# Patient Record
Sex: MALE | Race: WHITE | Employment: OTHER | URBAN - METROPOLITAN AREA
[De-identification: names, ages, dates, MRNs, and addresses within clinical notes are randomized per-mention and may not be internally consistent; named-entity substitution may affect disease eponyms.]

---

## 2017-02-15 ENCOUNTER — OFFICE VISIT (OUTPATIENT)
Dept: ORTHOPEDIC SURGERY | Age: 67
End: 2017-02-15

## 2017-02-15 VITALS
BODY MASS INDEX: 29.52 KG/M2 | HEIGHT: 74 IN | HEART RATE: 88 BPM | WEIGHT: 230 LBS | SYSTOLIC BLOOD PRESSURE: 151 MMHG | DIASTOLIC BLOOD PRESSURE: 92 MMHG

## 2017-02-15 DIAGNOSIS — M19.019 ACROMIOCLAVICULAR JOINT ARTHRITIS: ICD-10-CM

## 2017-02-15 DIAGNOSIS — M75.82 ROTATOR CUFF TENDONITIS, LEFT: Primary | ICD-10-CM

## 2017-02-15 DIAGNOSIS — M25.512 LEFT SHOULDER PAIN, UNSPECIFIED CHRONICITY: ICD-10-CM

## 2017-02-15 PROCEDURE — 99203 OFFICE O/P NEW LOW 30 MIN: CPT | Performed by: ORTHOPAEDIC SURGERY

## 2017-02-15 PROCEDURE — 73030 X-RAY EXAM OF SHOULDER: CPT | Performed by: ORTHOPAEDIC SURGERY

## 2017-02-15 PROCEDURE — 20610 DRAIN/INJ JOINT/BURSA W/O US: CPT | Performed by: ORTHOPAEDIC SURGERY

## 2017-03-15 ENCOUNTER — TELEPHONE (OUTPATIENT)
Dept: ENDOCRINOLOGY | Age: 67
End: 2017-03-15

## 2017-03-15 DIAGNOSIS — E10.8 TYPE 1 DIABETES MELLITUS WITH COMPLICATION (HCC): Primary | ICD-10-CM

## 2017-03-23 ENCOUNTER — OFFICE VISIT (OUTPATIENT)
Dept: ENDOCRINOLOGY | Age: 67
End: 2017-03-23

## 2017-03-23 VITALS
SYSTOLIC BLOOD PRESSURE: 122 MMHG | HEART RATE: 72 BPM | HEIGHT: 73 IN | OXYGEN SATURATION: 98 % | WEIGHT: 241.6 LBS | RESPIRATION RATE: 14 BRPM | DIASTOLIC BLOOD PRESSURE: 69 MMHG | BODY MASS INDEX: 32.02 KG/M2

## 2017-03-23 DIAGNOSIS — E10.11 DIABETIC KETOACIDOSIS WITH COMA ASSOCIATED WITH TYPE 1 DIABETES MELLITUS (HCC): Primary | ICD-10-CM

## 2017-03-23 DIAGNOSIS — E78.00 PURE HYPERCHOLESTEROLEMIA: ICD-10-CM

## 2017-03-23 LAB — HBA1C MFR BLD: 7.2 %

## 2017-03-23 PROCEDURE — 99214 OFFICE O/P EST MOD 30 MIN: CPT | Performed by: INTERNAL MEDICINE

## 2017-03-23 PROCEDURE — 83036 HEMOGLOBIN GLYCOSYLATED A1C: CPT | Performed by: INTERNAL MEDICINE

## 2017-03-29 ENCOUNTER — OFFICE VISIT (OUTPATIENT)
Dept: ORTHOPEDIC SURGERY | Age: 67
End: 2017-03-29

## 2017-03-29 ENCOUNTER — TELEPHONE (OUTPATIENT)
Dept: ORTHOPEDIC SURGERY | Age: 67
End: 2017-03-29

## 2017-03-29 VITALS
WEIGHT: 241.62 LBS | HEIGHT: 73 IN | BODY MASS INDEX: 32.02 KG/M2 | SYSTOLIC BLOOD PRESSURE: 107 MMHG | DIASTOLIC BLOOD PRESSURE: 59 MMHG | HEART RATE: 75 BPM | RESPIRATION RATE: 17 BRPM

## 2017-03-29 DIAGNOSIS — M25.512 ACUTE PAIN OF LEFT SHOULDER: Primary | ICD-10-CM

## 2017-03-29 PROCEDURE — 99213 OFFICE O/P EST LOW 20 MIN: CPT | Performed by: ORTHOPAEDIC SURGERY

## 2017-03-30 ENCOUNTER — TELEPHONE (OUTPATIENT)
Dept: ORTHOPEDIC SURGERY | Age: 67
End: 2017-03-30

## 2017-06-21 DIAGNOSIS — E78.00 PURE HYPERCHOLESTEROLEMIA: ICD-10-CM

## 2017-06-21 DIAGNOSIS — E10.11 DIABETIC KETOACIDOSIS WITH COMA ASSOCIATED WITH TYPE 1 DIABETES MELLITUS (HCC): ICD-10-CM

## 2017-06-21 LAB
A/G RATIO: 1.9 (ref 1.1–2.2)
ALBUMIN SERPL-MCNC: 4.3 G/DL (ref 3.4–5)
ALP BLD-CCNC: 96 U/L (ref 40–129)
ALT SERPL-CCNC: 16 U/L (ref 10–40)
ANION GAP SERPL CALCULATED.3IONS-SCNC: 12 MMOL/L (ref 3–16)
AST SERPL-CCNC: 16 U/L (ref 15–37)
BILIRUB SERPL-MCNC: 0.8 MG/DL (ref 0–1)
BUN BLDV-MCNC: 16 MG/DL (ref 7–20)
CALCIUM SERPL-MCNC: 8.8 MG/DL (ref 8.3–10.6)
CHLORIDE BLD-SCNC: 103 MMOL/L (ref 99–110)
CHOLESTEROL, TOTAL: 134 MG/DL (ref 0–199)
CO2: 26 MMOL/L (ref 21–32)
CREAT SERPL-MCNC: 1 MG/DL (ref 0.8–1.3)
CREATININE URINE: 133.5 MG/DL (ref 39–259)
ESTIMATED AVERAGE GLUCOSE: 154.2 MG/DL
GFR AFRICAN AMERICAN: >60
GFR NON-AFRICAN AMERICAN: >60
GLOBULIN: 2.3 G/DL
GLUCOSE BLD-MCNC: 154 MG/DL (ref 70–99)
HBA1C MFR BLD: 7 %
HDLC SERPL-MCNC: 33 MG/DL (ref 40–60)
LDL CHOLESTEROL CALCULATED: 87 MG/DL
MICROALBUMIN UR-MCNC: <1.2 MG/DL
MICROALBUMIN/CREAT UR-RTO: NORMAL MG/G (ref 0–30)
POTASSIUM SERPL-SCNC: 4.3 MMOL/L (ref 3.5–5.1)
SODIUM BLD-SCNC: 141 MMOL/L (ref 136–145)
TOTAL PROTEIN: 6.6 G/DL (ref 6.4–8.2)
TRIGL SERPL-MCNC: 70 MG/DL (ref 0–150)
TSH SERPL DL<=0.05 MIU/L-ACNC: 5.09 UIU/ML (ref 0.27–4.2)
VLDLC SERPL CALC-MCNC: 14 MG/DL

## 2017-09-14 ENCOUNTER — OFFICE VISIT (OUTPATIENT)
Dept: FAMILY MEDICINE CLINIC | Age: 67
End: 2017-09-14

## 2017-09-14 VITALS
WEIGHT: 243 LBS | DIASTOLIC BLOOD PRESSURE: 78 MMHG | HEART RATE: 96 BPM | HEIGHT: 74 IN | SYSTOLIC BLOOD PRESSURE: 130 MMHG | OXYGEN SATURATION: 95 % | BODY MASS INDEX: 31.18 KG/M2 | TEMPERATURE: 97.3 F

## 2017-09-14 DIAGNOSIS — E78.5 HYPERLIPIDEMIA, UNSPECIFIED HYPERLIPIDEMIA TYPE: Primary | ICD-10-CM

## 2017-09-14 DIAGNOSIS — Z11.59 NEED FOR HEPATITIS C SCREENING TEST: ICD-10-CM

## 2017-09-14 DIAGNOSIS — E55.9 VITAMIN D DEFICIENCY: ICD-10-CM

## 2017-09-14 PROCEDURE — 99203 OFFICE O/P NEW LOW 30 MIN: CPT | Performed by: NURSE PRACTITIONER

## 2017-09-14 RX ORDER — SIMVASTATIN 20 MG
TABLET ORAL
COMMUNITY
Start: 2017-06-22 | End: 2018-03-13

## 2017-09-14 ASSESSMENT — ENCOUNTER SYMPTOMS
ALLERGIC/IMMUNOLOGIC NEGATIVE: 1
RESPIRATORY NEGATIVE: 1
SHORTNESS OF BREATH: 0
EYES NEGATIVE: 1
GASTROINTESTINAL NEGATIVE: 1

## 2017-10-10 DIAGNOSIS — E78.5 HYPERLIPIDEMIA, UNSPECIFIED HYPERLIPIDEMIA TYPE: ICD-10-CM

## 2017-10-10 DIAGNOSIS — E55.9 VITAMIN D DEFICIENCY: ICD-10-CM

## 2017-10-10 DIAGNOSIS — Z11.59 NEED FOR HEPATITIS C SCREENING TEST: ICD-10-CM

## 2017-10-10 PROBLEM — E11.3293 MILD NONPROLIFERATIVE DIABETIC RETINOPATHY OF BOTH EYES WITHOUT MACULAR EDEMA ASSOCIATED WITH TYPE 2 DIABETES MELLITUS (HCC): Status: ACTIVE | Noted: 2017-10-10

## 2017-10-10 LAB
A/G RATIO: 1.8 (ref 1.1–2.2)
ALBUMIN SERPL-MCNC: 4.2 G/DL (ref 3.4–5)
ALP BLD-CCNC: 103 U/L (ref 40–129)
ALT SERPL-CCNC: 22 U/L (ref 10–40)
ANION GAP SERPL CALCULATED.3IONS-SCNC: 13 MMOL/L (ref 3–16)
AST SERPL-CCNC: 18 U/L (ref 15–37)
BILIRUB SERPL-MCNC: 0.8 MG/DL (ref 0–1)
BUN BLDV-MCNC: 19 MG/DL (ref 7–20)
CALCIUM SERPL-MCNC: 9 MG/DL (ref 8.3–10.6)
CHLORIDE BLD-SCNC: 100 MMOL/L (ref 99–110)
CHOLESTEROL, TOTAL: 133 MG/DL (ref 0–199)
CO2: 26 MMOL/L (ref 21–32)
CREAT SERPL-MCNC: 1 MG/DL (ref 0.8–1.3)
GFR AFRICAN AMERICAN: >60
GFR NON-AFRICAN AMERICAN: >60
GLOBULIN: 2.4 G/DL
GLUCOSE BLD-MCNC: 165 MG/DL (ref 70–99)
HDLC SERPL-MCNC: 34 MG/DL (ref 40–60)
HEPATITIS C ANTIBODY INTERPRETATION: NORMAL
LDL CHOLESTEROL CALCULATED: 82 MG/DL
POTASSIUM SERPL-SCNC: 4.5 MMOL/L (ref 3.5–5.1)
SODIUM BLD-SCNC: 139 MMOL/L (ref 136–145)
TOTAL PROTEIN: 6.6 G/DL (ref 6.4–8.2)
TRIGL SERPL-MCNC: 85 MG/DL (ref 0–150)
VITAMIN D 25-HYDROXY: 26.9 NG/ML
VLDLC SERPL CALC-MCNC: 17 MG/DL

## 2017-10-30 RX ORDER — LANCETS 33 GAUGE
EACH MISCELLANEOUS
Qty: 300 EACH | Refills: 1 | Status: SHIPPED | OUTPATIENT
Start: 2017-10-30 | End: 2018-02-20 | Stop reason: SDUPTHER

## 2017-10-30 RX ORDER — PEN NEEDLE, DIABETIC 32GX 5/32"
NEEDLE, DISPOSABLE MISCELLANEOUS
Qty: 360 EACH | Refills: 1 | Status: SHIPPED | OUTPATIENT
Start: 2017-10-30 | End: 2018-02-20 | Stop reason: SDUPTHER

## 2017-11-17 NOTE — TELEPHONE ENCOUNTER
Pt called in for a refill of his Gabapentin 600 mg that has no refills left. He takes one every six hours.  [place] would [place] [facility] [place] to [facility] [place] [id number]

## 2017-11-20 RX ORDER — GABAPENTIN 600 MG/1
600 TABLET ORAL DAILY
Qty: 90 TABLET | Refills: 1 | Status: SHIPPED | OUTPATIENT
Start: 2017-11-20 | End: 2017-12-21 | Stop reason: SDUPTHER

## 2017-12-12 ENCOUNTER — OFFICE VISIT (OUTPATIENT)
Dept: ENDOCRINOLOGY | Age: 67
End: 2017-12-12

## 2017-12-12 VITALS
SYSTOLIC BLOOD PRESSURE: 124 MMHG | HEART RATE: 82 BPM | OXYGEN SATURATION: 97 % | DIASTOLIC BLOOD PRESSURE: 83 MMHG | BODY MASS INDEX: 31.34 KG/M2 | WEIGHT: 244.2 LBS | HEIGHT: 74 IN | RESPIRATION RATE: 16 BRPM

## 2017-12-12 DIAGNOSIS — E78.00 PURE HYPERCHOLESTEROLEMIA: ICD-10-CM

## 2017-12-12 DIAGNOSIS — E10.9 TYPE 1 DIABETES MELLITUS WITHOUT COMPLICATION (HCC): Primary | ICD-10-CM

## 2017-12-12 LAB — HBA1C MFR BLD: 7.3 %

## 2017-12-12 PROCEDURE — G8484 FLU IMMUNIZE NO ADMIN: HCPCS | Performed by: INTERNAL MEDICINE

## 2017-12-12 PROCEDURE — 99214 OFFICE O/P EST MOD 30 MIN: CPT | Performed by: INTERNAL MEDICINE

## 2017-12-12 PROCEDURE — 83036 HEMOGLOBIN GLYCOSYLATED A1C: CPT | Performed by: INTERNAL MEDICINE

## 2017-12-12 PROCEDURE — 1036F TOBACCO NON-USER: CPT | Performed by: INTERNAL MEDICINE

## 2017-12-12 PROCEDURE — 3045F PR MOST RECENT HEMOGLOBIN A1C LEVEL 7.0-9.0%: CPT | Performed by: INTERNAL MEDICINE

## 2017-12-12 PROCEDURE — 3017F COLORECTAL CA SCREEN DOC REV: CPT | Performed by: INTERNAL MEDICINE

## 2017-12-12 PROCEDURE — G8417 CALC BMI ABV UP PARAM F/U: HCPCS | Performed by: INTERNAL MEDICINE

## 2017-12-12 PROCEDURE — 1123F ACP DISCUSS/DSCN MKR DOCD: CPT | Performed by: INTERNAL MEDICINE

## 2017-12-12 PROCEDURE — 4040F PNEUMOC VAC/ADMIN/RCVD: CPT | Performed by: INTERNAL MEDICINE

## 2017-12-12 PROCEDURE — G8427 DOCREV CUR MEDS BY ELIG CLIN: HCPCS | Performed by: INTERNAL MEDICINE

## 2017-12-12 NOTE — PROGRESS NOTES
Endocrinology     Follow up visit   Gillian Ball M.D. Phone: [phone]   FAX: [phone]       Terressa Sandhoff   YOB: 1950    Date of Visit:  12/12/2017    Allergies   Allergen Reactions    Macadamia Nut Oil      Other reaction(s): Not Recorded    Morphine Other (See Comments)     Skin turns gray and feel like he's covered in ants    Morphine And Related Other (See Comments)     Feels like bugs are crawling all over it and skin gets grayish color     Outpatient Prescriptions Marked as Taking for the 12/12/17 encounter (Office Visit) with Kathlee Saint, MD   Medication Sig Dispense Refill    gabapentin (NEURONTIN) 600 MG tablet Take 1 tablet by mouth daily 90 tablet 1    BD PEN NEEDLE CHELSEA U/F 32G X 4 MM MISC USE WITH LANTUS AND HUMALOG PENS 4 TIMES A DAY. 360 each 1    ONETOUCH DELICA LANCETS 07Z MISC USE 3 TIMES DAILY BEFORE  MEALS. 300 each 1    simvastatin (ZOCOR) 20 MG tablet       insulin lispro (HUMALOG KWIKPEN) 100 UNIT/ML pen Inject 12-14 Units into the skin 3 times daily (before meals) 45 Pen 0    insulin glargine (BASAGLAR KWIKPEN) 100 UNIT/ML injection pen Inject 40 Units into the skin nightly 15 Pen 5    aspirin 81 MG tablet Take 81 mg by mouth daily      glucose blood VI test strips (ASCENSIA AUTODISC VI;ONE TOUCH ULTRA TEST VI) strip 1 each by In Vitro route 3 times daily Dx Code E11.8           Vitals:    12/12/17 0855   BP: 124/83   Site: Left Arm   Position: Sitting   Cuff Size: Large Adult   Pulse: 82   Resp: 16   SpO2: 97%   Weight: 244 lb 3.2 oz (110.8 kg)   Height: 6' 2\" (1.88 m)     Body mass index is 31.35 kg/m².      Wt Readings from Last 3 Encounters:   12/12/17 244 lb 3.2 oz (110.8 kg)   09/14/17 243 lb (110.2 kg)   05/25/17 238 lb (108 kg)     BP Readings from Last 3 Encounters:   12/12/17 124/83   09/14/17 130/78   05/26/17 137/79        Past Medical History:   Diagnosis Date    Chronic kidney disease     [facility]2017 Dr Rocklin Habermann Mild nonproliferative diabetic retinopathy of both eyes without macular edema associated with type 2 diabetes mellitus ([facility] 10/10/2017    Type 2 diabetes mellitus without complication (HCC)     Type II or unspecified type diabetes mellitus without mention of complication, not stated as uncontrolled      Past Surgical History:   Procedure Laterality Date    APPENDECTOMY      COLONOSCOPY  05/20/2010    Dr. Tracy Travis       History reviewed. No pertinent family history. History   Smoking Status    Never Smoker   Smokeless Tobacco    Never Used      History   Alcohol Use    Yes     Comment: social        HPI      Kari Nur is a 78 y.o. male who is here for follow up of uncontrolled DM, HLP. PCP Ekaterina Tidwell MD       Patient has a PMH of Type 2 DM, hypertension, hyperlipidemia, DKA, BPH, PE, DVT    Diagnosed with Diabetes Mellitus type 2 in 2004,  Course has been variable . Microvascular complications: No known retinopathy (Last eye exam: 10/17), No nephropathy . No Peripheral neuropathy. He was having polyuria, polydypsia, dry mouth. Went to ER at [facility] and was found to have UTI and DKA. BS were >800. Oral meds were stopped and he was started in insulin. Home regimen:  Currently on basaglar  36 units QHS  Humalog 10 units TID with meals + Medium dose SSI     Previous medications: metformin, glimipiride. Blood glucose trend   fasting    pre-lunch 100-140  pre-supper 100-150  bedtime 120-160    No significant Hypoglycemia. Diet: Eats 3 meals/day. Nutrition education:No   Exercise: Walks      Hyperlipidemia: Currently is on Simvastatin 40 mg daily       Review of Systems   Constitutional: Negative for malaise/fatigue and weight loss. HENT: Negative for sore throat. Eyes: Negative for blurred vision. Respiratory: Negative for cough and shortness of breath.     Cardiovascular: Negative for chest

## 2017-12-21 ENCOUNTER — TELEPHONE (OUTPATIENT)
Dept: ENDOCRINOLOGY | Age: 67
End: 2017-12-21

## 2017-12-21 RX ORDER — GABAPENTIN 600 MG/1
TABLET ORAL
Qty: 120 TABLET | Refills: 2 | Status: SHIPPED | OUTPATIENT
Start: 2017-12-21 | End: 2018-04-09 | Stop reason: SDUPTHER

## 2017-12-21 NOTE — TELEPHONE ENCOUNTER
I let both Delma and mr. Jenise Simons know the Rx was written for once daily. Mr. Jenise Simons said the other doctor he was seeing had him taking it 4x daily.

## 2018-01-18 ENCOUNTER — OFFICE VISIT (OUTPATIENT)
Dept: INTERNAL MEDICINE | Age: 68
End: 2018-01-18

## 2018-01-18 ENCOUNTER — TELEPHONE (OUTPATIENT)
Dept: FAMILY MEDICINE CLINIC | Age: 68
End: 2018-01-18

## 2018-01-18 VITALS
TEMPERATURE: 98.8 F | WEIGHT: 245 LBS | HEART RATE: 84 BPM | SYSTOLIC BLOOD PRESSURE: 138 MMHG | BODY MASS INDEX: 31.46 KG/M2 | OXYGEN SATURATION: 97 % | DIASTOLIC BLOOD PRESSURE: 80 MMHG

## 2018-01-18 DIAGNOSIS — R68.89 FLU-LIKE SYMPTOMS: Primary | ICD-10-CM

## 2018-01-18 DIAGNOSIS — J01.10 ACUTE NON-RECURRENT FRONTAL SINUSITIS: ICD-10-CM

## 2018-01-18 LAB
INFLUENZA A ANTIBODY: NORMAL
INFLUENZA B ANTIBODY: NORMAL

## 2018-01-18 PROCEDURE — 87804 INFLUENZA ASSAY W/OPTIC: CPT | Performed by: NURSE PRACTITIONER

## 2018-01-18 PROCEDURE — 4040F PNEUMOC VAC/ADMIN/RCVD: CPT | Performed by: NURSE PRACTITIONER

## 2018-01-18 PROCEDURE — 1123F ACP DISCUSS/DSCN MKR DOCD: CPT | Performed by: NURSE PRACTITIONER

## 2018-01-18 PROCEDURE — G8427 DOCREV CUR MEDS BY ELIG CLIN: HCPCS | Performed by: NURSE PRACTITIONER

## 2018-01-18 PROCEDURE — 1036F TOBACCO NON-USER: CPT | Performed by: NURSE PRACTITIONER

## 2018-01-18 PROCEDURE — 3017F COLORECTAL CA SCREEN DOC REV: CPT | Performed by: NURSE PRACTITIONER

## 2018-01-18 PROCEDURE — 99213 OFFICE O/P EST LOW 20 MIN: CPT | Performed by: NURSE PRACTITIONER

## 2018-01-18 PROCEDURE — G8417 CALC BMI ABV UP PARAM F/U: HCPCS | Performed by: NURSE PRACTITIONER

## 2018-01-18 PROCEDURE — G8484 FLU IMMUNIZE NO ADMIN: HCPCS | Performed by: NURSE PRACTITIONER

## 2018-01-18 RX ORDER — AMOXICILLIN 500 MG/1
500 CAPSULE ORAL 3 TIMES DAILY
Qty: 21 CAPSULE | Refills: 0 | Status: SHIPPED | OUTPATIENT
Start: 2018-01-18 | End: 2018-01-25

## 2018-01-18 ASSESSMENT — ENCOUNTER SYMPTOMS
DIARRHEA: 0
NAUSEA: 1
WHEEZING: 0
SINUS PRESSURE: 1
COUGH: 1
CONSTIPATION: 0
RHINORRHEA: 1
SHORTNESS OF BREATH: 0
SINUS PAIN: 1
VOMITING: 0

## 2018-01-18 NOTE — PROGRESS NOTES
Subjective:      Patient ID: Wesley Ly is a 78 y.o. male. HPI   Patient comes to the office complaining of cough, congestion, drainage that has been present for the last several days. His cough is not productive of yellow sputum. Patient does have associated fever or chills as high as 100.6, last fever yesterday evening. Patient reports gastrointestinal symptoms related to His present condition of nausea, thinks it is related to otc cough liquid medication. His symptoms started 3 days. Denies any sob, cp, difficultry breathing. Does report body aches all over. Did not receive the flu shot this year. Thinks that he may have the flu. 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 Miriam Ely       History reviewed. No pertinent family history. Social History     Social History    Marital status:      Spouse name: N/A    Number of children: N/A    Years of education: N/A     Occupational History    Not on file. Social History Main Topics    Smoking status: Never Smoker    Smokeless tobacco: Never Used    Alcohol use Yes      Comment: social     Drug use: No    Sexual activity: Not on file     Other Topics Concern    Not on file     Social History Narrative    No narrative on file       Review of Systems   Constitutional: Positive for chills, fatigue and fever. HENT: Positive for postnasal drip, rhinorrhea, sinus pain and sinus pressure. Respiratory: Positive for cough. Negative for shortness of breath and wheezing. Cardiovascular: Negative for chest pain and palpitations. Gastrointestinal: Positive for nausea. Negative for constipation, diarrhea and vomiting. Hematological: Negative for adenopathy. Objective:     Vitals:    01/18/18 1513   BP: 138/80   Site: Left Arm   Position: Sitting   Cuff Size: Medium Adult   Pulse: 84   Temp: 98.8 °F (37.1 °C)   SpO2: 97%   Weight: 245 lb (111.1 kg)     Physical Exam   Constitutional: He appears well-developed and well-nourished. HENT:   Nose: Right sinus exhibits frontal sinus tenderness. Left sinus exhibits frontal sinus tenderness. Cardiovascular: Normal rate, regular rhythm and normal heart sounds. Pulmonary/Chest: Effort normal and breath sounds normal.   Skin: Skin is warm and dry. Psychiatric: He has a normal mood and affect. His behavior is normal. Judgment and thought content normal.     Current Outpatient Prescriptions   Medication Sig Dispense Refill    amoxicillin (AMOXIL) 500 MG capsule Take 1 capsule by mouth 3 times daily for 7 days 21 capsule 0    gabapentin (NEURONTIN) 600 MG tablet Take 1 tablet 4 times a day 120 tablet 2    BD PEN NEEDLE CHELSEA U/F 32G X 4 MM MISC USE WITH LANTUS AND HUMALOG PENS 4 TIMES A DAY. 360 each 1    ONETOUCH DELICA LANCETS 64E MISC USE 3 TIMES DAILY BEFORE  MEALS.  300 each 1    insulin aspart (NOVOLOG) 100 UNIT/ML injection vial Inject 15 Units into the skin      simvastatin (ZOCOR) 20 MG tablet       insulin lispro (HUMALOG KWIKPEN) 100 UNIT/ML pen Inject 12-14 Units into the skin 3 times daily (before meals) 45 Pen 0    insulin glargine (BASAGLAR KWIKPEN) 100 UNIT/ML injection pen Inject 40 Units into the skin nightly 15 Pen 5    aspirin 81 MG tablet Take 81 mg by mouth daily      glucose blood VI test strips (ASCENSIA AUTODISC VI;ONE TOUCH ULTRA TEST VI) strip 1 each by In Vitro route 3 times daily Dx Code E11.8      meloxicam

## 2018-02-15 ENCOUNTER — OFFICE VISIT (OUTPATIENT)
Dept: DERMATOLOGY | Age: 68
End: 2018-02-15

## 2018-02-15 DIAGNOSIS — L57.0 KERATOSIS, ACTINIC: ICD-10-CM

## 2018-02-15 DIAGNOSIS — L82.0 SEBORRHEIC KERATOSES, INFLAMED: Primary | ICD-10-CM

## 2018-02-15 DIAGNOSIS — L82.1 SEBORRHEIC KERATOSES: ICD-10-CM

## 2018-02-15 PROCEDURE — 1036F TOBACCO NON-USER: CPT | Performed by: DERMATOLOGY

## 2018-02-15 PROCEDURE — 99213 OFFICE O/P EST LOW 20 MIN: CPT | Performed by: DERMATOLOGY

## 2018-02-15 PROCEDURE — G8484 FLU IMMUNIZE NO ADMIN: HCPCS | Performed by: DERMATOLOGY

## 2018-02-15 PROCEDURE — 1123F ACP DISCUSS/DSCN MKR DOCD: CPT | Performed by: DERMATOLOGY

## 2018-02-15 PROCEDURE — 3017F COLORECTAL CA SCREEN DOC REV: CPT | Performed by: DERMATOLOGY

## 2018-02-15 PROCEDURE — 17003 DESTRUCT PREMALG LES 2-14: CPT | Performed by: DERMATOLOGY

## 2018-02-15 PROCEDURE — G8427 DOCREV CUR MEDS BY ELIG CLIN: HCPCS | Performed by: DERMATOLOGY

## 2018-02-15 PROCEDURE — G8417 CALC BMI ABV UP PARAM F/U: HCPCS | Performed by: DERMATOLOGY

## 2018-02-15 PROCEDURE — 17110 DESTRUCTION B9 LES UP TO 14: CPT | Performed by: DERMATOLOGY

## 2018-02-15 PROCEDURE — 17000 DESTRUCT PREMALG LESION: CPT | Performed by: DERMATOLOGY

## 2018-02-15 PROCEDURE — 4040F PNEUMOC VAC/ADMIN/RCVD: CPT | Performed by: DERMATOLOGY

## 2018-02-15 NOTE — PROGRESS NOTES
[facility]) Dermatology  Brandin Jacobson M.D.  [phone]       Gini Dickinson  1950    78 y.o. male     Date of Visit: 2/15/2018    Chief Complaint:   Chief Complaint   Patient presents with    New Patient     No hx of skin cancer    Skin Lesion     head and chest        I was asked to see this patient by Dr. Mcbride ref. provider found. History of Present Illness:  1. New scaly papule right parasternal chest-new in the last 2 weeks. Pruritic. Rubs on his clothing. Multiple other similar smaller lesions central chest.     Multiple scaly papules on his scalp. Dry. Increasing in number. Scaly raised papule left scalp. Irritated. No previous personal or family history of skin cancer. Always wears a hat. Review of Systems:  Constitutional: Reports general sense of well-being       Past Medical History, Surgical History, Family History, Medications and Allergies reviewed. Social History:   Social History     Social History    Marital status:      Spouse name: N/A    Number of children: N/A    Years of education: N/A     Occupational History    Not on file. Social History Main Topics    Smoking status: Never Smoker    Smokeless tobacco: Never Used    Alcohol use Yes      Comment: social     Drug use: No    Sexual activity: Not on file     Other Topics Concern    Not on file     Social History Narrative    No narrative on file       Physical Examination       -General: Well-appearing, NAD  Right parasternal chest hyperkeratotic stuck on tan papule-inflamed seborrheic keratosis. Left scalp hyperkeratotic stuck on verrucous papule-seborrheic keratosis. Multiple gritty erythematous papules over his scalp. Multiple smaller tan stuck on noninflamed papules central chest      Assessment and Plan     1. Seborrheic keratoses, inflamed - Right chest, left scalp lesion(s) treated w/ liquid nitrogen. Edu re: risk of blister formation, discomfort, scar, hypopigmentation.

## 2018-02-20 ENCOUNTER — TELEPHONE (OUTPATIENT)
Dept: ENDOCRINOLOGY | Age: 68
End: 2018-02-20

## 2018-02-20 RX ORDER — LANCETS 33 GAUGE
EACH MISCELLANEOUS
Qty: 300 EACH | Refills: 2 | Status: SHIPPED | OUTPATIENT
Start: 2018-02-20 | End: 2018-02-26 | Stop reason: SDUPTHER

## 2018-02-20 NOTE — TELEPHONE ENCOUNTER
Pt stopped in to say he needs refills on Basaglar, 4mm Pen Needles, One touch Delica Lancets and One touch Test strips All called to [facility] in pharmacy. He also updated his insurance with new card; [employer].

## 2018-02-23 NOTE — TELEPHONE ENCOUNTER
PT called stating he needs his FMLA PPW filled out ASAP and needs to be handed in early next week. Thanks!

## 2018-02-26 ENCOUNTER — TELEPHONE (OUTPATIENT)
Dept: ENDOCRINOLOGY | Age: 68
End: 2018-02-26

## 2018-02-26 RX ORDER — LANCETS 33 GAUGE
EACH MISCELLANEOUS
Qty: 300 EACH | Refills: 2 | Status: SHIPPED | OUTPATIENT
Start: 2018-02-26 | End: 2018-02-27 | Stop reason: SDUPTHER

## 2018-02-27 RX ORDER — LANCETS 33 GAUGE
EACH MISCELLANEOUS
Qty: 300 EACH | Refills: 2 | Status: SHIPPED | OUTPATIENT
Start: 2018-02-27

## 2018-02-27 RX ORDER — INSULIN GLARGINE 100 [IU]/ML
36 INJECTION, SOLUTION SUBCUTANEOUS NIGHTLY
Qty: 45 ML | Refills: 1 | Status: SHIPPED | OUTPATIENT
Start: 2018-02-27 | End: 2018-06-25 | Stop reason: SDUPTHER

## 2018-02-27 RX ORDER — INSULIN GLARGINE 100 [IU]/ML
36 INJECTION, SOLUTION SUBCUTANEOUS NIGHTLY
COMMUNITY
End: 2018-02-27 | Stop reason: SDUPTHER

## 2018-03-09 DIAGNOSIS — E10.9 TYPE 1 DIABETES MELLITUS WITHOUT COMPLICATION (HCC): ICD-10-CM

## 2018-03-09 DIAGNOSIS — E78.00 PURE HYPERCHOLESTEROLEMIA: ICD-10-CM

## 2018-03-09 LAB
A/G RATIO: 1.7 (ref 1.1–2.2)
ALBUMIN SERPL-MCNC: 4.4 G/DL (ref 3.4–5)
ALP BLD-CCNC: 97 U/L (ref 40–129)
ALT SERPL-CCNC: 24 U/L (ref 10–40)
ANION GAP SERPL CALCULATED.3IONS-SCNC: 15 MMOL/L (ref 3–16)
AST SERPL-CCNC: 21 U/L (ref 15–37)
BILIRUB SERPL-MCNC: 0.7 MG/DL (ref 0–1)
BUN BLDV-MCNC: 26 MG/DL (ref 7–20)
CALCIUM SERPL-MCNC: 8.9 MG/DL (ref 8.3–10.6)
CHLORIDE BLD-SCNC: 102 MMOL/L (ref 99–110)
CHOLESTEROL, TOTAL: 129 MG/DL (ref 0–199)
CO2: 26 MMOL/L (ref 21–32)
CREAT SERPL-MCNC: 1.1 MG/DL (ref 0.8–1.3)
CREATININE URINE: 136 MG/DL (ref 39–259)
ESTIMATED AVERAGE GLUCOSE: 159.9 MG/DL
GFR AFRICAN AMERICAN: >60
GFR NON-AFRICAN AMERICAN: >60
GLOBULIN: 2.6 G/DL
GLUCOSE BLD-MCNC: 158 MG/DL (ref 70–99)
HBA1C MFR BLD: 7.2 %
HDLC SERPL-MCNC: 33 MG/DL (ref 40–60)
LDL CHOLESTEROL CALCULATED: 78 MG/DL
MICROALBUMIN UR-MCNC: <1.2 MG/DL
MICROALBUMIN/CREAT UR-RTO: NORMAL MG/G (ref 0–30)
POTASSIUM SERPL-SCNC: 4.5 MMOL/L (ref 3.5–5.1)
SODIUM BLD-SCNC: 143 MMOL/L (ref 136–145)
TOTAL PROTEIN: 7 G/DL (ref 6.4–8.2)
TRIGL SERPL-MCNC: 92 MG/DL (ref 0–150)
TSH SERPL DL<=0.05 MIU/L-ACNC: 5.22 UIU/ML (ref 0.27–4.2)
VLDLC SERPL CALC-MCNC: 18 MG/DL

## 2018-03-13 ENCOUNTER — OFFICE VISIT (OUTPATIENT)
Dept: FAMILY MEDICINE CLINIC | Age: 68
End: 2018-03-13

## 2018-03-13 VITALS
HEART RATE: 95 BPM | WEIGHT: 249 LBS | TEMPERATURE: 98.4 F | HEIGHT: 74 IN | SYSTOLIC BLOOD PRESSURE: 140 MMHG | DIASTOLIC BLOOD PRESSURE: 86 MMHG | BODY MASS INDEX: 31.95 KG/M2 | OXYGEN SATURATION: 96 %

## 2018-03-13 DIAGNOSIS — Z23 NEED FOR PNEUMOCOCCAL VACCINATION: ICD-10-CM

## 2018-03-13 DIAGNOSIS — E78.5 HYPERLIPIDEMIA, UNSPECIFIED HYPERLIPIDEMIA TYPE: Primary | ICD-10-CM

## 2018-03-13 RX ORDER — SIMVASTATIN 40 MG
40 TABLET ORAL EVERY EVENING
Qty: 90 TABLET | Refills: 1 | Status: SHIPPED | OUTPATIENT
Start: 2018-03-13 | End: 2018-10-09 | Stop reason: SDUPTHER

## 2018-03-13 RX ORDER — SIMVASTATIN 20 MG
TABLET ORAL
Qty: 30 TABLET | Status: CANCELLED | OUTPATIENT
Start: 2018-03-13

## 2018-03-13 ASSESSMENT — ENCOUNTER SYMPTOMS
GASTROINTESTINAL NEGATIVE: 1
ALLERGIC/IMMUNOLOGIC NEGATIVE: 1
EYES NEGATIVE: 1
RESPIRATORY NEGATIVE: 1
SHORTNESS OF BREATH: 0

## 2018-03-13 NOTE — PROGRESS NOTES
3/13/18    Liza Coleman  1950      Chief Complaint   Patient presents with    Hyperlipidemia       Vitals:    03/13/18 0900   BP: (!) 140/86   Pulse: 95   Temp: 98.4 °F (36.9 °C)   SpO2: 96%         Immunization History   Administered Date(s) Administered    Pneumococcal 13-valent Conjugate ([id number]) 02/01/2017    Pneumococcal Polysaccharide ([id number]) 03/13/2018       Allergies   Allergen Reactions    Morphine Other (See Comments)     Skin turns gray and feel like he's covered in ants    Morphine And Related Other (See Comments)     Feels like bugs are crawling all over it and skin gets grayish color     Outpatient Prescriptions Marked as Taking for the 3/13/18 encounter (Office Visit) with Omer Brice NP   Medication Sig Dispense Refill    simvastatin (ZOCOR) 40 MG tablet Take 1 tablet by mouth every evening 90 tablet 1    zoster recombinant adjuvanted vaccine (SHINGRIX) 50 MCG SUSR injection Inject 0.5 mLs into the muscle once for 1 dose 1 each 0    ONETOUCH DELICA LANCETS 51S MISC Dx Code E 10.9 300 each 2    insulin glargine (BASAGLAR KWIKPEN) 100 UNIT/ML injection pen Dispense Basaglar Dx Code E 10.9 45 mL 1    glucose blood VI test strips (ASCENSIA AUTODISC VI;ONE TOUCH ULTRA TEST VI) strip 1 each by In Vitro route 3 times daily Dx Code E 10.9 300 each 2    Insulin Pen Needle (BD PEN NEEDLE CHELSEA U/F) 32G X 4 MM MISC Inject 1 each into the skin 4 times daily (after meals and at bedtime) E 10.9 360 each 1    BASAGLAR KWIKPEN 100 UNIT/ML injection pen Inject 36 Units into the skin nightly 45 mL 1    gabapentin (NEURONTIN) 600 MG tablet Take 1 tablet 4 times a day 120 tablet 2    insulin lispro (HUMALOG KWIKPEN) 100 UNIT/ML pen Inject 12-14 Units into the skin 3 times daily (before meals) 45 Pen 0    aspirin 81 MG tablet Take 81 mg by mouth daily         Past Medical History:   Diagnosis Date    Chronic kidney disease     Diabetic eye exam ([facility] 10/06/2017    Dr Hai Breaux Neurological: Negative. Negative for focal weakness. Hematological: Negative. Psychiatric/Behavioral: Negative. Physical Exam   Constitutional: He is oriented to person, place, and time. He appears well-developed and well-nourished. HENT:   Head: Normocephalic. Cardiovascular: Normal rate, regular rhythm, normal heart sounds and intact distal pulses. Pulmonary/Chest: Effort normal and breath sounds normal. No respiratory distress. He has no wheezes. He has no rales. Abdominal: Soft. Bowel sounds are normal. He exhibits no distension. There is no tenderness. There is no rebound. Neurological: He is alert and oriented to person, place, and time. Skin: Skin is warm and dry. Psychiatric: He has a normal mood and affect. His behavior is normal. Judgment and thought content normal.         1. Hyperlipidemia, unspecified hyperlipidemia type  Condition stable continue the medications, treatments, will check labs as appropriate       The patient received counseling on the following healthy behaviors: nutrition, exercise, medication adherence and decrease in alcohol consumption    Patient given educational materials on Hyperlipidemia and Nutrition if appropriate    I have instructed the patient to complete a self tracking handout on diet and instructed them to bring it with them to the  next appointment. Discussed use, benefit, and side effects of prescribed medications. Barriers to medication compliance addressed. All patient questions answered. Pt voiced understanding.          2. Need for pneumococcal vaccination  Pneumococcal polysaccharide vaccine 23-valent greater than or equal to 3yo subcutaneous/IM    [place] IMMUNIZ ADMIN,1 SINGLE/COMB VAC/TOXOID       Patient Self-Management Goal for Chronic Condition  Goal: I will follow the diet recommendations provided by my doctor: low fat, low cholesterol and substitute healthy fats, such as olive oil, canola oil, grapeseed oil for saturated

## 2018-04-09 RX ORDER — GABAPENTIN 600 MG/1
TABLET ORAL
Qty: 120 TABLET | Refills: 3 | Status: SHIPPED | OUTPATIENT
Start: 2018-04-09 | End: 2018-10-02 | Stop reason: SDUPTHER

## 2018-04-18 ENCOUNTER — TELEPHONE (OUTPATIENT)
Dept: ENDOCRINOLOGY | Age: 68
End: 2018-04-18

## 2018-06-25 ENCOUNTER — OFFICE VISIT (OUTPATIENT)
Dept: ENDOCRINOLOGY | Age: 68
End: 2018-06-25

## 2018-06-25 VITALS
SYSTOLIC BLOOD PRESSURE: 133 MMHG | HEART RATE: 76 BPM | DIASTOLIC BLOOD PRESSURE: 83 MMHG | WEIGHT: 246 LBS | HEIGHT: 74 IN | BODY MASS INDEX: 31.57 KG/M2 | OXYGEN SATURATION: 95 % | RESPIRATION RATE: 14 BRPM

## 2018-06-25 DIAGNOSIS — R79.89 HIGH SERUM THYROID STIMULATING HORMONE (TSH): ICD-10-CM

## 2018-06-25 DIAGNOSIS — E78.5 HYPERLIPIDEMIA, UNSPECIFIED HYPERLIPIDEMIA TYPE: ICD-10-CM

## 2018-06-25 DIAGNOSIS — E11.3293 MILD NONPROLIFERATIVE DIABETIC RETINOPATHY OF BOTH EYES WITHOUT MACULAR EDEMA ASSOCIATED WITH TYPE 2 DIABETES MELLITUS (HCC): Primary | ICD-10-CM

## 2018-06-25 LAB — HBA1C MFR BLD: 7.5 %

## 2018-06-25 PROCEDURE — 99214 OFFICE O/P EST MOD 30 MIN: CPT | Performed by: INTERNAL MEDICINE

## 2018-06-25 PROCEDURE — 83036 HEMOGLOBIN GLYCOSYLATED A1C: CPT | Performed by: INTERNAL MEDICINE

## 2018-06-27 DIAGNOSIS — R79.89 HIGH SERUM THYROID STIMULATING HORMONE (TSH): ICD-10-CM

## 2018-06-27 LAB
T3 FREE: 3.1 PG/ML (ref 2.3–4.2)
T4 FREE: 1.4 NG/DL (ref 0.9–1.8)
THYROID PEROXIDASE (TPO) ABS: 12 IU/ML
TSH SERPL DL<=0.05 MIU/L-ACNC: 4.45 UIU/ML (ref 0.27–4.2)

## 2018-10-02 RX ORDER — GABAPENTIN 600 MG/1
600 TABLET ORAL 4 TIMES DAILY
Qty: 120 TABLET | Refills: 2 | Status: SHIPPED | OUTPATIENT
Start: 2018-10-02 | End: 2019-02-10 | Stop reason: SDUPTHER

## 2018-10-09 ENCOUNTER — OFFICE VISIT (OUTPATIENT)
Dept: INTERNAL MEDICINE CLINIC | Age: 68
End: 2018-10-09
Payer: COMMERCIAL

## 2018-10-09 VITALS
WEIGHT: 248 LBS | DIASTOLIC BLOOD PRESSURE: 68 MMHG | HEIGHT: 74 IN | SYSTOLIC BLOOD PRESSURE: 126 MMHG | BODY MASS INDEX: 31.83 KG/M2

## 2018-10-09 DIAGNOSIS — E10.42 TYPE 1 DIABETES MELLITUS WITH DIABETIC POLYNEUROPATHY (HCC): Primary | ICD-10-CM

## 2018-10-09 DIAGNOSIS — E78.5 HYPERLIPIDEMIA, UNSPECIFIED HYPERLIPIDEMIA TYPE: ICD-10-CM

## 2018-10-09 PROCEDURE — 99213 OFFICE O/P EST LOW 20 MIN: CPT | Performed by: INTERNAL MEDICINE

## 2018-10-09 RX ORDER — SIMVASTATIN 40 MG
40 TABLET ORAL EVERY EVENING
Qty: 90 TABLET | Refills: 1 | Status: SHIPPED | OUTPATIENT
Start: 2018-10-09 | End: 2019-09-03 | Stop reason: SDUPTHER

## 2018-10-09 ASSESSMENT — ENCOUNTER SYMPTOMS
CONSTIPATION: 0
BLOOD IN STOOL: 0
SHORTNESS OF BREATH: 0
DIARRHEA: 0
SINUS PRESSURE: 1
COUGH: 0

## 2018-10-09 ASSESSMENT — PATIENT HEALTH QUESTIONNAIRE - PHQ9
2. FEELING DOWN, DEPRESSED OR HOPELESS: 0
SUM OF ALL RESPONSES TO PHQ QUESTIONS 1-9: 0
SUM OF ALL RESPONSES TO PHQ9 QUESTIONS 1 & 2: 0
SUM OF ALL RESPONSES TO PHQ QUESTIONS 1-9: 0
1. LITTLE INTEREST OR PLEASURE IN DOING THINGS: 0

## 2018-10-09 NOTE — PROGRESS NOTES
10/9/2018    Neyda Reyes (:  1950) is a 80 y.o. male, here to establish care and for evaluation of the following medical concerns:    HPI    Will be having oral surgery next week. Has an abscessed tooth. Having diaphoresis. Started around the same times as the abscessed tooth. No fevers, nausea, chills. Happens daytime and nighttime. Trying to lose weight but hasn't been successful. No excessive fatigue. He thinks that it's related to the tooth abscess. Diabetes - Low has been around 78. Recently has been getting more like 180-200. Initially thought it was a sinus infection. Then saw a dentist who diagnosed the infection. Prior to that sugars had been 100-150/160, he suspects that the high sugars are from the infection. Eye exam scheduled for Thursday. +hypoglycemia awareness. Takes gabapentin for diabetic neuropathy, prescribed 4 times per day but sometimes will not take as frequently, depends on how his pain is doing. Takes at least 2-3 times per day. Sees an endocrinologist regularly. Review of Systems   Constitutional: Positive for diaphoresis. Negative for fatigue, fever and unexpected weight change. HENT: Positive for congestion and sinus pressure. Eyes: Negative for visual disturbance. Respiratory: Negative for cough and shortness of breath. Cardiovascular: Negative for chest pain, palpitations and leg swelling. Gastrointestinal: Negative for blood in stool, constipation and diarrhea. Endocrine: Negative for polyphagia and polyuria. Genitourinary: Negative for dysuria and frequency. Musculoskeletal: Positive for arthralgias. Negative for joint swelling. Skin: Negative for rash. Allergic/Immunologic: Positive for environmental allergies. Neurological: Negative for dizziness, weakness, light-headedness and headaches. Hematological: Negative for adenopathy. Prior to Visit Medications    Medication Sig Taking?  Authorizing Provider   simvastatin Exam reveals no gallop and no friction rub. No murmur heard. Pulmonary/Chest: Effort normal and breath sounds normal. No respiratory distress. He has no wheezes. He has no rales. Abdominal: Soft. Bowel sounds are normal. He exhibits no distension and no mass. There is no tenderness. Musculoskeletal: Normal range of motion. He exhibits no edema or deformity. Neurological: He is alert and oriented to person, place, and time. He has normal reflexes. No cranial nerve deficit or sensory deficit. He exhibits normal muscle tone. Coordination normal.   Skin: Skin is warm and dry. No rash noted. No erythema. Psychiatric: He has a normal mood and affect. His speech is normal and behavior is normal. Judgment and thought content normal. Cognition and memory are normal.   Vitals reviewed. ASSESSMENT/PLAN:  1. Type 1 diabetes mellitus with diabetic polyneuropathy (HCC)  -Most recent A1c was actually fairly well controlled, but with the recent high sugars at home I anticipate that the A1c will be significantly higher. The diaphoresis and high sugars are likely both secondary to the tooth infection. However, if the diaphoresis does not improve after the tooth is taking care of in the next couple of weeks, I would want to evaluate this further and see him back sooner.  -Ordered A1c and BMP  -Insulin per his endocrinologist  -Continue gabapentin at current dose for diabetic neuropathy. Cautioned him not to stop the medication suddenly as it would put him at risk for seizures. - Basic Metabolic Panel; Future  - Hemoglobin A1C; Future    2. Hyperlipidemia, unspecified hyperlipidemia type  -Stable  -Continue simvastatin  -Will be due for lipid panel at next visit      Return in about 6 months (around 4/9/2019) for DM follow up.

## 2018-10-26 DIAGNOSIS — E10.42 TYPE 1 DIABETES MELLITUS WITH DIABETIC POLYNEUROPATHY (HCC): ICD-10-CM

## 2018-10-26 LAB
ANION GAP SERPL CALCULATED.3IONS-SCNC: 10 MMOL/L (ref 3–16)
BUN BLDV-MCNC: 24 MG/DL (ref 7–20)
CALCIUM SERPL-MCNC: 8.8 MG/DL (ref 8.3–10.6)
CHLORIDE BLD-SCNC: 102 MMOL/L (ref 99–110)
CO2: 28 MMOL/L (ref 21–32)
CREAT SERPL-MCNC: 1.1 MG/DL (ref 0.8–1.3)
ESTIMATED AVERAGE GLUCOSE: 180 MG/DL
GFR AFRICAN AMERICAN: >60
GFR NON-AFRICAN AMERICAN: >60
GLUCOSE BLD-MCNC: 170 MG/DL (ref 70–99)
HBA1C MFR BLD: 7.9 %
POTASSIUM SERPL-SCNC: 4.7 MMOL/L (ref 3.5–5.1)
SODIUM BLD-SCNC: 140 MMOL/L (ref 136–145)

## 2018-10-29 ENCOUNTER — OFFICE VISIT (OUTPATIENT)
Dept: ENDOCRINOLOGY | Age: 68
End: 2018-10-29
Payer: COMMERCIAL

## 2018-10-29 VITALS
RESPIRATION RATE: 16 BRPM | HEIGHT: 74 IN | OXYGEN SATURATION: 96 % | DIASTOLIC BLOOD PRESSURE: 87 MMHG | BODY MASS INDEX: 32.19 KG/M2 | WEIGHT: 250.8 LBS | SYSTOLIC BLOOD PRESSURE: 138 MMHG | HEART RATE: 80 BPM

## 2018-10-29 DIAGNOSIS — E11.3293 MILD NONPROLIFERATIVE DIABETIC RETINOPATHY OF BOTH EYES WITHOUT MACULAR EDEMA ASSOCIATED WITH TYPE 2 DIABETES MELLITUS (HCC): ICD-10-CM

## 2018-10-29 DIAGNOSIS — E03.8 SUBCLINICAL HYPOTHYROIDISM: ICD-10-CM

## 2018-10-29 DIAGNOSIS — E10.42 TYPE 1 DIABETES MELLITUS WITH DIABETIC POLYNEUROPATHY (HCC): Primary | ICD-10-CM

## 2018-10-29 DIAGNOSIS — E78.5 HYPERLIPIDEMIA, UNSPECIFIED HYPERLIPIDEMIA TYPE: ICD-10-CM

## 2018-10-29 PROCEDURE — 99214 OFFICE O/P EST MOD 30 MIN: CPT | Performed by: INTERNAL MEDICINE

## 2018-11-12 RX ORDER — INSULIN LISPRO 100 [IU]/ML
INJECTION, SOLUTION INTRAVENOUS; SUBCUTANEOUS
Qty: 45 ML | Refills: 1 | Status: SHIPPED | OUTPATIENT
Start: 2018-11-12 | End: 2019-07-24 | Stop reason: SDUPTHER

## 2018-11-12 RX ORDER — INSULIN GLARGINE 100 [IU]/ML
36-40 INJECTION, SOLUTION SUBCUTANEOUS NIGHTLY
Qty: 45 ML | Refills: 1 | Status: SHIPPED | OUTPATIENT
Start: 2018-11-12 | End: 2018-11-29 | Stop reason: SDUPTHER

## 2018-11-29 ENCOUNTER — OFFICE VISIT (OUTPATIENT)
Dept: ENDOCRINOLOGY | Age: 68
End: 2018-11-29
Payer: COMMERCIAL

## 2018-11-29 VITALS
HEART RATE: 75 BPM | RESPIRATION RATE: 14 BRPM | WEIGHT: 251.4 LBS | BODY MASS INDEX: 32.26 KG/M2 | HEIGHT: 74 IN | OXYGEN SATURATION: 97 % | DIASTOLIC BLOOD PRESSURE: 81 MMHG | SYSTOLIC BLOOD PRESSURE: 124 MMHG

## 2018-11-29 DIAGNOSIS — E10.42 TYPE 1 DIABETES MELLITUS WITH DIABETIC POLYNEUROPATHY (HCC): Primary | ICD-10-CM

## 2018-11-29 DIAGNOSIS — E78.5 HYPERLIPIDEMIA, UNSPECIFIED HYPERLIPIDEMIA TYPE: ICD-10-CM

## 2018-11-29 DIAGNOSIS — E03.8 SUBCLINICAL HYPOTHYROIDISM: ICD-10-CM

## 2018-11-29 PROCEDURE — 99214 OFFICE O/P EST MOD 30 MIN: CPT | Performed by: INTERNAL MEDICINE

## 2018-11-29 NOTE — PROGRESS NOTES
Endocrinology  Lai Gill M.D. Phone: [phone]   FAX: [phone]       Louis Days   YOB: 1950    Date of Visit:  11/29/2018    Allergies   Allergen Reactions    Morphine Other (See Comments)     Skin turns gray and feel like he's covered in ants    Morphine And Related Other (See Comments)     Feels like bugs are crawling all over it and skin gets grayish color     Outpatient Prescriptions Marked as Taking for the 11/29/18 encounter (Office Visit) with Sydney Granado MD   Medication Sig Dispense Refill    Insulin Pen Needle (BD PEN NEEDLE CHELSEA U/F) 32G X 4 MM MISC Inject 1 each into the skin 4 times daily (after meals and at bedtime) E 10.9 360 each 3    HUMALOG KWIKPEN 100 UNIT/ML pen INJECT 10 TO 18 UNITS      SUBCUTANEOUSLY 3 TIMES A   DAY BEFORE MEALS 45 mL 1    simvastatin (ZOCOR) 40 MG tablet Take 1 tablet by mouth every evening 90 tablet 1    gabapentin (NEURONTIN) 600 MG tablet Take 1 tablet by mouth 4 times daily. DX CODE E 11.3293. 120 tablet 2    blood glucose test strips (ASCENSIA AUTODISC VI;ONE TOUCH ULTRA TEST VI) strip 1 each by In Vitro route 3 times daily Dx Code E 10.9 300 each 2    ONETOUCH DELICA LANCETS 65W MISC Dx Code E 10.9 300 each 2    insulin glargine (BASAGLAR KWIKPEN) 100 UNIT/ML injection pen Dispense Basaglar Dx Code E 10.9 (Patient taking differently: Inject 36-40 Units into the skin nightly Dispense Basaglar Dx Code E 10.9) 45 mL 1    aspirin 81 MG tablet Take 81 mg by mouth daily           Vitals:    11/29/18 1125   BP: 124/81   Site: Right Upper Arm   Position: Sitting   Cuff Size: Large Adult   Pulse: 75   Resp: 14   SpO2: 97%   Weight: 251 lb 6.4 oz (114 kg)   Height: 6' 2\" (1.88 m)     Body mass index is 32.28 kg/m².      Wt Readings from Last 3 Encounters:   11/29/18 251 lb 6.4 oz (114 kg)   10/29/18 250 lb 12.8 oz (113.8 kg)   10/09/18 248 lb (112.5 kg)     BP Readings from Last 3 Encounters:   11/29/18 124/81   10/29/18 glimipiride. Blood glucose trend   fasting    pre-lunch 100-140  pre-supper 100-150  bedtime 120-160    BS were high when he had tooth infection . Tooth has been extracted. No significant Hypoglycemia. Diet: Eats 3 meals/day. Nutrition education: yes  Exercise: Walks    Hyperlipidemia: He has hyperlipidemia. Currently is on Simvastatin 40 mg daily       Review of Systems   Constitutional: Negative for malaise/fatigue and weight loss. HENT: Negative for sore throat. Eyes: Negative for blurred vision. Respiratory: Negative for cough and shortness of breath. Cardiovascular: Negative for chest pain and palpitations. Gastrointestinal: Negative for abdominal pain, heartburn, nausea and vomiting. Genitourinary: Negative for frequency and urgency. Musculoskeletal: Positive for joint pain. Negative for back pain and myalgias. Skin: Negative for rash. Neurological: Positive for tingling and sensory change. Negative for headaches. Endo/Heme/Allergies: Negative for polydipsia. Psychiatric/Behavioral: Negative for depression. The patient is not nervous/anxious. Physical Exam   Constitutional: He is oriented to person, place, and time. He appears well-developed and well-nourished. No distress. Psychiatric: His behavior is normal. Thought content normal.               Assessment/Plan      1. Type 1 DM     Shaji Truong is a 80 y.o. male has Type 1 DM       A1c 16.1 %--> 7.7 %  ---> 6.4 %---> 7.1 %---> 7.2 %--->7.3 % --> 7.5 %  ---> 7.9 %       He had DKA in 01/16. Insulinopenic at this point. c-peptide 0.2, CARMEN antibodies negative    Most likely he has loate onset type 1 DM given low c-peptide and episode of DKA. DM is complicated by peripheral neuropathy. He is concerned about weight gain. Discussed doing low carb diet.     Will recommend 30-40 grams with each meal.     -Decrease basaglar to 35 units.   -Use carb ratio of 1:5 for humalog.   -Continue medium

## 2019-01-02 ENCOUNTER — TELEPHONE (OUTPATIENT)
Dept: ENDOCRINOLOGY | Age: 69
End: 2019-01-02

## 2019-01-07 ENCOUNTER — TELEPHONE (OUTPATIENT)
Dept: ENDOCRINOLOGY | Age: 69
End: 2019-01-07

## 2019-01-10 ENCOUNTER — OFFICE VISIT (OUTPATIENT)
Dept: ENDOCRINOLOGY | Age: 69
End: 2019-01-10
Payer: COMMERCIAL

## 2019-01-10 VITALS
WEIGHT: 253.8 LBS | HEIGHT: 74 IN | OXYGEN SATURATION: 98 % | HEART RATE: 79 BPM | BODY MASS INDEX: 32.57 KG/M2 | DIASTOLIC BLOOD PRESSURE: 82 MMHG | SYSTOLIC BLOOD PRESSURE: 137 MMHG

## 2019-01-10 DIAGNOSIS — E11.3293 MILD NONPROLIFERATIVE DIABETIC RETINOPATHY OF BOTH EYES WITHOUT MACULAR EDEMA ASSOCIATED WITH TYPE 2 DIABETES MELLITUS (HCC): ICD-10-CM

## 2019-01-10 DIAGNOSIS — E11.10 DIABETIC KETOACIDOSIS WITHOUT COMA ASSOCIATED WITH TYPE 2 DIABETES MELLITUS (HCC): ICD-10-CM

## 2019-01-10 DIAGNOSIS — E03.8 SUBCLINICAL HYPOTHYROIDISM: ICD-10-CM

## 2019-01-10 DIAGNOSIS — E78.5 HYPERLIPIDEMIA, UNSPECIFIED HYPERLIPIDEMIA TYPE: ICD-10-CM

## 2019-01-10 DIAGNOSIS — E10.42 TYPE 1 DIABETES MELLITUS WITH DIABETIC POLYNEUROPATHY (HCC): Primary | ICD-10-CM

## 2019-01-10 LAB — HBA1C MFR BLD: 7.5 %

## 2019-01-10 PROCEDURE — 83036 HEMOGLOBIN GLYCOSYLATED A1C: CPT | Performed by: NURSE PRACTITIONER

## 2019-01-10 PROCEDURE — 99214 OFFICE O/P EST MOD 30 MIN: CPT | Performed by: NURSE PRACTITIONER

## 2019-01-10 ASSESSMENT — ENCOUNTER SYMPTOMS
COLOR CHANGE: 0
SHORTNESS OF BREATH: 0
EYE PAIN: 0
DIARRHEA: 0
CONSTIPATION: 0
NAUSEA: 0

## 2019-01-11 DIAGNOSIS — E10.42 TYPE 1 DIABETES MELLITUS WITH DIABETIC POLYNEUROPATHY (HCC): ICD-10-CM

## 2019-01-15 LAB — C-PEPTIDE: 4.6 NG/ML (ref 1.1–4.4)

## 2019-01-16 LAB — GLUTAMIC ACID DECARB AB: <5 IU/ML (ref 0–5)

## 2019-01-23 ENCOUNTER — TELEPHONE (OUTPATIENT)
Dept: ENDOCRINOLOGY | Age: 69
End: 2019-01-23

## 2019-02-11 RX ORDER — GABAPENTIN 600 MG/1
TABLET ORAL
Qty: 120 TABLET | Refills: 1 | Status: SHIPPED | OUTPATIENT
Start: 2019-02-11 | End: 2019-05-23 | Stop reason: SDUPTHER

## 2019-03-07 ENCOUNTER — OFFICE VISIT (OUTPATIENT)
Dept: ENDOCRINOLOGY | Age: 69
End: 2019-03-07
Payer: COMMERCIAL

## 2019-03-07 VITALS
BODY MASS INDEX: 32.21 KG/M2 | WEIGHT: 251 LBS | HEART RATE: 84 BPM | HEIGHT: 74 IN | DIASTOLIC BLOOD PRESSURE: 82 MMHG | OXYGEN SATURATION: 98 % | SYSTOLIC BLOOD PRESSURE: 138 MMHG

## 2019-03-07 DIAGNOSIS — E78.5 HYPERLIPIDEMIA, UNSPECIFIED HYPERLIPIDEMIA TYPE: ICD-10-CM

## 2019-03-07 DIAGNOSIS — E11.8 TYPE 2 DIABETES MELLITUS WITH COMPLICATION, WITH LONG-TERM CURRENT USE OF INSULIN (HCC): Primary | ICD-10-CM

## 2019-03-07 DIAGNOSIS — R79.89 HIGH SERUM THYROID STIMULATING HORMONE (TSH): ICD-10-CM

## 2019-03-07 DIAGNOSIS — Z79.4 TYPE 2 DIABETES MELLITUS WITH COMPLICATION, WITH LONG-TERM CURRENT USE OF INSULIN (HCC): Primary | ICD-10-CM

## 2019-03-07 PROCEDURE — 99214 OFFICE O/P EST MOD 30 MIN: CPT | Performed by: INTERNAL MEDICINE

## 2019-05-24 RX ORDER — GABAPENTIN 600 MG/1
TABLET ORAL
Qty: 120 TABLET | Refills: 1 | Status: SHIPPED | OUTPATIENT
Start: 2019-05-24 | End: 2019-05-24 | Stop reason: SDUPTHER

## 2019-05-24 RX ORDER — GABAPENTIN 600 MG/1
TABLET ORAL
Qty: 120 TABLET | Refills: 1 | Status: SHIPPED | OUTPATIENT
Start: 2019-05-24 | End: 2019-10-24 | Stop reason: SDUPTHER

## 2019-06-21 DIAGNOSIS — E11.8 TYPE 2 DIABETES MELLITUS WITH COMPLICATION, WITH LONG-TERM CURRENT USE OF INSULIN (HCC): ICD-10-CM

## 2019-06-21 DIAGNOSIS — E78.5 HYPERLIPIDEMIA, UNSPECIFIED HYPERLIPIDEMIA TYPE: ICD-10-CM

## 2019-06-21 DIAGNOSIS — R79.89 HIGH SERUM THYROID STIMULATING HORMONE (TSH): ICD-10-CM

## 2019-06-21 DIAGNOSIS — Z79.4 TYPE 2 DIABETES MELLITUS WITH COMPLICATION, WITH LONG-TERM CURRENT USE OF INSULIN (HCC): ICD-10-CM

## 2019-06-21 LAB
A/G RATIO: 2 (ref 1.1–2.2)
ALBUMIN SERPL-MCNC: 4.5 G/DL (ref 3.4–5)
ALP BLD-CCNC: 96 U/L (ref 40–129)
ALT SERPL-CCNC: 23 U/L (ref 10–40)
ANION GAP SERPL CALCULATED.3IONS-SCNC: 13 MMOL/L (ref 3–16)
AST SERPL-CCNC: 17 U/L (ref 15–37)
BILIRUB SERPL-MCNC: 0.7 MG/DL (ref 0–1)
BUN BLDV-MCNC: 18 MG/DL (ref 7–20)
CALCIUM SERPL-MCNC: 9 MG/DL (ref 8.3–10.6)
CHLORIDE BLD-SCNC: 102 MMOL/L (ref 99–110)
CHOLESTEROL, TOTAL: 106 MG/DL (ref 0–199)
CO2: 25 MMOL/L (ref 21–32)
CREAT SERPL-MCNC: 1 MG/DL (ref 0.8–1.3)
CREATININE URINE: 229.8 MG/DL (ref 39–259)
GFR AFRICAN AMERICAN: >60
GFR NON-AFRICAN AMERICAN: >60
GLOBULIN: 2.3 G/DL
GLUCOSE BLD-MCNC: 139 MG/DL (ref 70–99)
HDLC SERPL-MCNC: 29 MG/DL (ref 40–60)
LDL CHOLESTEROL CALCULATED: 60 MG/DL
MICROALBUMIN UR-MCNC: <1.2 MG/DL
MICROALBUMIN/CREAT UR-RTO: NORMAL MG/G (ref 0–30)
POTASSIUM SERPL-SCNC: 4.6 MMOL/L (ref 3.5–5.1)
SODIUM BLD-SCNC: 140 MMOL/L (ref 136–145)
TOTAL PROTEIN: 6.8 G/DL (ref 6.4–8.2)
TRIGL SERPL-MCNC: 87 MG/DL (ref 0–150)
TSH SERPL DL<=0.05 MIU/L-ACNC: 4.08 UIU/ML (ref 0.27–4.2)
VLDLC SERPL CALC-MCNC: 17 MG/DL

## 2019-06-22 LAB
ESTIMATED AVERAGE GLUCOSE: 174.3 MG/DL
HBA1C MFR BLD: 7.7 %

## 2019-07-08 ENCOUNTER — OFFICE VISIT (OUTPATIENT)
Dept: ENDOCRINOLOGY | Age: 69
End: 2019-07-08
Payer: COMMERCIAL

## 2019-07-08 VITALS
HEART RATE: 78 BPM | DIASTOLIC BLOOD PRESSURE: 74 MMHG | HEIGHT: 74 IN | SYSTOLIC BLOOD PRESSURE: 115 MMHG | WEIGHT: 244.2 LBS | OXYGEN SATURATION: 97 % | BODY MASS INDEX: 31.34 KG/M2

## 2019-07-08 DIAGNOSIS — R79.89 HIGH SERUM THYROID STIMULATING HORMONE (TSH): ICD-10-CM

## 2019-07-08 DIAGNOSIS — E10.42 TYPE 1 DIABETES MELLITUS WITH DIABETIC POLYNEUROPATHY (HCC): Primary | ICD-10-CM

## 2019-07-08 DIAGNOSIS — E78.5 HYPERLIPIDEMIA, UNSPECIFIED HYPERLIPIDEMIA TYPE: ICD-10-CM

## 2019-07-08 PROCEDURE — 99214 OFFICE O/P EST MOD 30 MIN: CPT | Performed by: INTERNAL MEDICINE

## 2019-07-08 NOTE — PROGRESS NOTES
Endocrinology  June Garcia M.D. Phone: [phone]   FAX: [phone]       Yelitza Burrell   YOB: 1950    Date of Visit:  7/8/2019    Allergies   Allergen Reactions    Morphine Other (See Comments)     Skin turns gray and feel like he's covered in ants    Morphine And Related Other (See Comments)     Feels like bugs are crawling all over it and skin gets grayish color     Outpatient Medications Marked as Taking for the 7/8/19 encounter (Office Visit) with Daniel Oropeza MD   Medication Sig Dispense Refill    gabapentin (NEURONTIN) 600 MG tablet TAKE ONE TABLET BY MOUTH FOUR TIMES A  tablet 1    Liraglutide (VICTOZA) 18 MG/3ML SOPN SC injection Inject 1.2 mg into the skin daily 18 mL 3    Insulin Pen Needle (BD PEN NEEDLE CHELSEA U/F) 32G X 4 MM MISC Inject 1 each into the skin 4 times daily (after meals and at bedtime) E 10.9 360 each 3    HUMALOG KWIKPEN 100 UNIT/ML pen INJECT 10 TO 18 UNITS      SUBCUTANEOUSLY 3 TIMES A   DAY BEFORE MEALS (Patient taking differently: INJECT 10 TO 20 UNITS      SUBCUTANEOUSLY 3 TIMES A   DAY BEFORE MEALS) 45 mL 1    simvastatin (ZOCOR) 40 MG tablet Take 1 tablet by mouth every evening 90 tablet 1    blood glucose test strips (ASCENSIA AUTODISC VI;ONE TOUCH ULTRA TEST VI) strip 1 each by In Vitro route 3 times daily Dx Code E 10.9 300 each 2    ONETOUCH DELICA LANCETS 18I MISC Dx Code E 10.9 300 each 2    insulin glargine (BASAGLAR KWIKPEN) 100 UNIT/ML injection pen Dispense Basaglar Dx Code E 10.9 (Patient taking differently: Inject 36-40 Units into the skin nightly Dispense Basaglar Dx Code E 10.9) 45 mL 1    aspirin 81 MG tablet Take 81 mg by mouth daily           Vitals:    07/08/19 1032   BP: 115/74   Site: Right Upper Arm   Position: Sitting   Cuff Size: Medium Adult   Pulse: 78   SpO2: 97%   Weight: 244 lb 3.2 oz (110.8 kg)   Height: 6' 2\" (1.88 m)     Body mass index is 31.35 kg/m².      Wt Readings from Last 3 Encounters:

## 2019-07-12 DIAGNOSIS — E10.42 TYPE 1 DIABETES MELLITUS WITH DIABETIC POLYNEUROPATHY (HCC): Primary | ICD-10-CM

## 2019-07-26 RX ORDER — INSULIN GLARGINE 100 [IU]/ML
INJECTION, SOLUTION SUBCUTANEOUS
Qty: 45 ML | Refills: 3 | Status: SHIPPED | OUTPATIENT
Start: 2019-07-26 | End: 2020-10-26

## 2019-07-26 RX ORDER — INSULIN LISPRO 100 [IU]/ML
INJECTION, SOLUTION INTRAVENOUS; SUBCUTANEOUS
Qty: 45 ML | Refills: 3 | Status: SHIPPED | OUTPATIENT
Start: 2019-07-26 | End: 2020-10-26

## 2019-09-03 ENCOUNTER — TELEPHONE (OUTPATIENT)
Dept: INTERNAL MEDICINE CLINIC | Age: 69
End: 2019-09-03

## 2019-09-03 RX ORDER — SIMVASTATIN 40 MG
TABLET ORAL
Qty: 90 TABLET | Refills: 0 | OUTPATIENT
Start: 2019-09-03

## 2019-09-03 RX ORDER — SIMVASTATIN 40 MG
40 TABLET ORAL EVERY EVENING
Qty: 90 TABLET | Refills: 1 | Status: SHIPPED | OUTPATIENT
Start: 2019-09-03 | End: 2020-04-27

## 2019-09-03 NOTE — TELEPHONE ENCOUNTER
Pt states his refill request was denied and wants to know why, pt is scheduled for an appt 10/16/19 is that ok to fill simvastatin (ZOCOR) 40 MG tablet     Please advise.

## 2019-10-15 DIAGNOSIS — E10.42 TYPE 1 DIABETES MELLITUS WITH DIABETIC POLYNEUROPATHY (HCC): Primary | ICD-10-CM

## 2019-10-16 ENCOUNTER — OFFICE VISIT (OUTPATIENT)
Dept: INTERNAL MEDICINE CLINIC | Age: 69
End: 2019-10-16
Payer: COMMERCIAL

## 2019-10-16 VITALS
OXYGEN SATURATION: 98 % | HEART RATE: 85 BPM | SYSTOLIC BLOOD PRESSURE: 118 MMHG | BODY MASS INDEX: 31.54 KG/M2 | DIASTOLIC BLOOD PRESSURE: 70 MMHG | HEIGHT: 74 IN | WEIGHT: 245.8 LBS

## 2019-10-16 DIAGNOSIS — E78.5 HYPERLIPIDEMIA, UNSPECIFIED HYPERLIPIDEMIA TYPE: ICD-10-CM

## 2019-10-16 DIAGNOSIS — E10.42 TYPE 1 DIABETES MELLITUS WITH DIABETIC POLYNEUROPATHY (HCC): Primary | ICD-10-CM

## 2019-10-16 PROCEDURE — 99213 OFFICE O/P EST LOW 20 MIN: CPT | Performed by: INTERNAL MEDICINE

## 2019-10-16 ASSESSMENT — PATIENT HEALTH QUESTIONNAIRE - PHQ9
SUM OF ALL RESPONSES TO PHQ QUESTIONS 1-9: 0
2. FEELING DOWN, DEPRESSED OR HOPELESS: 0
SUM OF ALL RESPONSES TO PHQ9 QUESTIONS 1 & 2: 0
1. LITTLE INTEREST OR PLEASURE IN DOING THINGS: 0
SUM OF ALL RESPONSES TO PHQ QUESTIONS 1-9: 0

## 2019-10-25 RX ORDER — GABAPENTIN 600 MG/1
TABLET ORAL
Qty: 120 TABLET | Refills: 3 | Status: SHIPPED | OUTPATIENT
Start: 2019-10-25 | End: 2020-04-15

## 2019-11-12 ENCOUNTER — OFFICE VISIT (OUTPATIENT)
Dept: ENDOCRINOLOGY | Age: 69
End: 2019-11-12
Payer: COMMERCIAL

## 2019-11-12 VITALS
OXYGEN SATURATION: 99 % | DIASTOLIC BLOOD PRESSURE: 73 MMHG | BODY MASS INDEX: 31.6 KG/M2 | SYSTOLIC BLOOD PRESSURE: 112 MMHG | HEIGHT: 74 IN | WEIGHT: 246.2 LBS | HEART RATE: 74 BPM

## 2019-11-12 DIAGNOSIS — E78.5 HYPERLIPIDEMIA, UNSPECIFIED HYPERLIPIDEMIA TYPE: ICD-10-CM

## 2019-11-12 DIAGNOSIS — E10.42 TYPE 1 DIABETES MELLITUS WITH DIABETIC POLYNEUROPATHY (HCC): Primary | ICD-10-CM

## 2019-11-12 LAB — HBA1C MFR BLD: 7.3 %

## 2019-11-12 PROCEDURE — 83036 HEMOGLOBIN GLYCOSYLATED A1C: CPT | Performed by: INTERNAL MEDICINE

## 2019-11-12 PROCEDURE — 99214 OFFICE O/P EST MOD 30 MIN: CPT | Performed by: INTERNAL MEDICINE

## 2020-03-09 ENCOUNTER — OFFICE VISIT (OUTPATIENT)
Dept: ENDOCRINOLOGY | Age: 70
End: 2020-03-09
Payer: COMMERCIAL

## 2020-03-09 VITALS
SYSTOLIC BLOOD PRESSURE: 126 MMHG | BODY MASS INDEX: 32.57 KG/M2 | OXYGEN SATURATION: 96 % | HEART RATE: 74 BPM | HEIGHT: 74 IN | WEIGHT: 253.8 LBS | DIASTOLIC BLOOD PRESSURE: 77 MMHG

## 2020-03-09 LAB — HBA1C MFR BLD: 7.9 %

## 2020-03-09 PROCEDURE — 99214 OFFICE O/P EST MOD 30 MIN: CPT | Performed by: INTERNAL MEDICINE

## 2020-03-09 PROCEDURE — 83036 HEMOGLOBIN GLYCOSYLATED A1C: CPT | Performed by: INTERNAL MEDICINE

## 2020-03-09 PROCEDURE — 3051F HG A1C>EQUAL 7.0%<8.0%: CPT | Performed by: INTERNAL MEDICINE

## 2020-03-09 NOTE — PROGRESS NOTES
Encounters:   03/09/20 126/77   11/12/19 112/73   10/16/19 118/70        Past Medical History:   Diagnosis Date    Chronic kidney disease     Diabetic eye exam ([facility] 10/06/2017    Dr Alphonse Lopez Hyperlipidemia 3/13/2018    Mild nonproliferative diabetic retinopathy of both eyes without macular edema associated with type 2 diabetes mellitus ([facility] 10/10/2017    Personal history of DVT (deep vein thrombosis)     Pulmonary emboli (HCC)     when hospitalized for DKA    Subclinical hypothyroidism 10/29/2018    [facility]     Type II or unspecified type diabetes mellitus without mention of complication, not stated as uncontrolled      Past Surgical History:   Procedure Laterality Date    APPENDECTOMY      COLONOSCOPY  05/20/2010    Dr. Ghazala Salazar    [facility]      TURP  02/2016     Family History   Problem Relation Age of Onset    Cancer Mother         uterine (chemo/radiation done), leukemia 2006    Heart Surgery Father         valve replacement    Diabetes Brother         type 2     Social History     Tobacco Use   Smoking Status Never Smoker   Smokeless Tobacco Never Used      Social History     Substance and Sexual Activity   Alcohol Use Yes    Comment: social - maybe 1/day       HPI      Elan Urena is a 77 y.o. male who is here for follow up of uncontrolled DM, HLP. PCP Robin Lawrence MD       Patient has a PMH of Type 2 DM, hypertension, hyperlipidemia, DKA, BPH, PE, DVT    Diagnosed with Diabetes Mellitus  in 2004,  Course has been variable . Microvascular complications: No known retinopathy Has cataracts (Last eye exam: 10/17, 10/18),   No nephropathy . No Peripheral neuropathy. In 2016, he was having polyuria, polydypsia, dry mouth. Went to ER at [place] [address] and was found to have UTI and DKA. BS were >800. Oral meds were stopped and he was started in insulin.      Home regimen:  Currently on basaglar

## 2020-04-15 ENCOUNTER — TELEPHONE (OUTPATIENT)
Dept: ENDOCRINOLOGY | Age: 70
End: 2020-04-15

## 2020-04-15 RX ORDER — GABAPENTIN 600 MG/1
TABLET ORAL
Qty: 120 TABLET | Refills: 2 | Status: SHIPPED | OUTPATIENT
Start: 2020-04-15 | End: 2020-08-31

## 2020-07-06 RX ORDER — BLOOD SUGAR DIAGNOSTIC
STRIP MISCELLANEOUS
Qty: 300 STRIP | Refills: 2 | Status: SHIPPED | OUTPATIENT
Start: 2020-07-06 | End: 2021-06-14

## 2020-07-13 ENCOUNTER — OFFICE VISIT (OUTPATIENT)
Dept: ENDOCRINOLOGY | Age: 70
End: 2020-07-13
Payer: COMMERCIAL

## 2020-07-13 VITALS
WEIGHT: 246.6 LBS | HEIGHT: 74 IN | BODY MASS INDEX: 31.65 KG/M2 | OXYGEN SATURATION: 97 % | DIASTOLIC BLOOD PRESSURE: 69 MMHG | HEART RATE: 71 BPM | SYSTOLIC BLOOD PRESSURE: 125 MMHG

## 2020-07-13 PROCEDURE — 99214 OFFICE O/P EST MOD 30 MIN: CPT | Performed by: INTERNAL MEDICINE

## 2020-07-13 PROCEDURE — 3051F HG A1C>EQUAL 7.0%<8.0%: CPT | Performed by: INTERNAL MEDICINE

## 2020-07-13 NOTE — PROGRESS NOTES
Endocrinology  Eveline Cotton M.D. Phone: [phone]   FAX: [phone]       [facility]   YOB: 1950    Date of Visit:  7/13/2020    Allergies   Allergen Reactions    Morphine Other (See Comments)     Skin turns gray and feel like he's covered in ants    Morphine And Related Other (See Comments)     Feels like bugs are crawling all over it and skin gets grayish color     Outpatient Medications Marked as Taking for the 7/13/20 encounter (Office Visit) with Vernetta Angelucci, MD   Medication Sig Dispense Refill    ONETOUCH ULTRA strip USE AND DISCARD 1 TEST     STRIP 3 TIMES A DAY. 300 strip 2    simvastatin (ZOCOR) 40 MG tablet TAKE ONE TABLET BY MOUTH EVERY EVENING 90 tablet 1    gabapentin (NEURONTIN) 600 MG tablet TAKE ONE TABLET BY MOUTH FOUR TIMES A  tablet 2    Multiple Vitamins-Minerals (MULTIVITAMIN ADULT PO) Take by mouth      Insulin Pen Needle (BD PEN NEEDLE CHELSEA U/F) 32G X 4 MM MISC Inject 1 each into the skin 4 times daily (after meals and at bedtime) E 10.9 600 each 3    BASAGLAR KWIKPEN 100 UNIT/ML injection pen INJECT 36-40 UNITS         SUBCUTANEOUSLY NIGHTLY 45 mL 3    HUMALOG KWIKPEN 100 UNIT/ML pen INJECT 10 TO 18 UNITS      SUBCUTANEOUSLY 3 TIMES A   DAY BEFORE MEALS 45 mL 3    Liraglutide (VICTOZA) 18 MG/3ML SOPN SC injection Inject 1.2 mg into the skin daily 18 mL 3    ONETOUCH DELICA LANCETS 75L MISC Dx Code E 10.9 300 each 2    aspirin 81 MG tablet Take 81 mg by mouth daily           Vitals:    07/13/20 0837   BP: 125/69   Site: Left Upper Arm   Position: Sitting   Cuff Size: Medium Adult   Pulse: 71   SpO2: 97%   Weight: 246 lb 9.6 oz (111.9 kg)   Height: 6' 2\" (1.88 m)     Body mass index is 31.66 kg/m².      Wt Readings from Last 3 Encounters:   07/13/20 246 lb 9.6 oz (111.9 kg)   03/09/20 253 lb 12.8 oz (115.1 kg)   11/12/19 246 lb 3.2 oz (111.7 kg)     BP Readings from Last 3 Encounters:   07/13/20 125/69   03/09/20 126/77   11/12/19 112/73        Past Medical History:   Diagnosis Date    Chronic kidney disease     Diabetic eye exam ([facility] 10/06/2017    Dr Jane Kent Hyperlipidemia 3/13/2018    Mild nonproliferative diabetic retinopathy of both eyes without macular edema associated with type 2 diabetes mellitus ([facility] [facility] 10/10/2017    Personal history of DVT (deep vein thrombosis)     Pulmonary emboli (HCC)     when hospitalized for DKA    Subclinical hypothyroidism 10/29/2018    Type 2 diabetes mellitus without complication ([facility]     Type II or unspecified type diabetes mellitus without mention of complication, not stated as uncontrolled      Past Surgical History:   Procedure Laterality Date    APPENDECTOMY      COLONOSCOPY  05/20/2010    Dr. Jamin Khoury    [facility]      TURP  02/2016     Family History   Problem Relation Age of Onset    Cancer Mother         uterine (chemo/radiation done), leukemia 2006    Heart Surgery Father         valve replacement    Diabetes Brother         type 2     Social History     Tobacco Use   Smoking Status Never Smoker   Smokeless Tobacco Never Used      Social History     Substance and Sexual Activity   Alcohol Use Yes    Comment: social - maybe 1/day       HPI      Fabi Palacios is a 72 y.o. male who is here for follow up of uncontrolled DM, HLP. PCP Cherie Stanton MD       Patient has a PMH of Type 2 DM, hypertension, hyperlipidemia, DKA, BPH, PE, DVT    Diagnosed with Diabetes Mellitus  in 2004,  Course has been variable . Microvascular complications: + retinopathy s/p laser  (Last eye exam: 10/17, 10/18, 06/20 ),   No nephropathy . No Peripheral neuropathy. In 2016, he was having polyuria, polydypsia, dry mouth. Went to ER at [facility] and was found to have UTI and DKA. BS were >800. Oral meds were stopped and he was started in insulin.      Home regimen:  Currently on basaglar  40-45 units QHS  Humalog on carb ratio of 1:5 + Medium dose SSI   victoza  1.2 mg daily        Previous medications: metformin, glimipiride. Blood glucose trend   fasting 100-140   pre-lunch 100-140  pre-supper   bedtime 100-150      No significant Hypoglycemia. Diet: Eats 3 meals/day. Nutrition education: yes  Exercise: Walks    Hyperlipidemia: He has hyperlipidemia. Currently is on Simvastatin 40 mg daily     Subclinical hypothyroidism :     TSH has been high normal.     No FH of thyroid disease. Weight is stable. Review of Systems   Constitutional: Negative for malaise/fatigue and weight loss. HENT: Negative for sore throat. Eyes: Negative for blurred vision. Respiratory: Negative for cough and shortness of breath. Cardiovascular: Negative for chest pain and palpitations. Gastrointestinal: Negative for abdominal pain, heartburn, nausea and vomiting. Genitourinary: Negative for frequency and urgency. Musculoskeletal: Positive for joint pain. Negative for back pain and myalgias. Skin: Negative for rash. Neurological: Positive for tingling and sensory change. Negative for headaches. Endo/Heme/Allergies: Negative for polydipsia. Psychiatric/Behavioral: Negative for depression. The patient is not nervous/anxious. Physical Exam   Constitutional: He is oriented to person, place, and time. He appears well-developed and well-nourished. No distress. Psychiatric: His behavior is normal. Thought content normal.         Visual inspection:  Deformity/amputation: absent  Skin lesions/pre-ulcerative calluses: absent  Edema: right- negative, left- negative    Sensory exam:  Monofilament sensation: normal  (minimum of 5 random plantar locations tested, avoiding callused areas - > 1 area with absence of sensation is + for neuropathy)    Plus at least one of the following:  Pulses:       Assessment/Plan      1.  Type 2 DM     Naveed Munguia is a 72 y.o. male has DM       A1c 16.1 %--> 7.7 %  ---> 6.4 %---> 7.1 %---> 7.2 %--->7.3

## 2020-08-31 RX ORDER — GABAPENTIN 600 MG/1
TABLET ORAL
Qty: 120 TABLET | Refills: 0 | Status: SHIPPED | OUTPATIENT
Start: 2020-08-31 | End: 2020-10-23

## 2020-10-23 RX ORDER — GABAPENTIN 600 MG/1
TABLET ORAL
Qty: 120 TABLET | Refills: 0 | Status: SHIPPED | OUTPATIENT
Start: 2020-10-23 | End: 2020-11-23 | Stop reason: SDUPTHER

## 2020-10-26 RX ORDER — INSULIN LISPRO 100 [IU]/ML
INJECTION, SOLUTION INTRAVENOUS; SUBCUTANEOUS
Qty: 45 ML | Refills: 3 | Status: SHIPPED | OUTPATIENT
Start: 2020-10-26 | End: 2021-11-04 | Stop reason: SDUPTHER

## 2020-10-26 RX ORDER — INSULIN GLARGINE 100 [IU]/ML
INJECTION, SOLUTION SUBCUTANEOUS
Qty: 45 ML | Refills: 3 | Status: SHIPPED | OUTPATIENT
Start: 2020-10-26 | End: 2021-03-23

## 2020-10-26 RX ORDER — PEN NEEDLE, DIABETIC 32GX 5/32"
NEEDLE, DISPOSABLE MISCELLANEOUS
Qty: 100 EACH | Refills: 3 | Status: SHIPPED | OUTPATIENT
Start: 2020-10-26 | End: 2021-03-23 | Stop reason: SDUPTHER

## 2020-10-26 NOTE — TELEPHONE ENCOUNTER
LOV: 7/13/2020    NOV: 1/18/2021    DOSE:     Frequency:     Pharmacy as Pended:     Per LOV Note: Continue basaglar 40-45 units QHS  -Continue carb ratio  1:5 for humalog.   -Continue medium dose SSI    Per Last PHONE NOTE:     Lab Results   Component Value Date    LABA1C 7.4 07/06/2020

## 2020-11-16 DIAGNOSIS — E78.5 HYPERLIPIDEMIA, UNSPECIFIED HYPERLIPIDEMIA TYPE: ICD-10-CM

## 2020-11-16 DIAGNOSIS — E10.42 TYPE 1 DIABETES MELLITUS WITH DIABETIC POLYNEUROPATHY (HCC): ICD-10-CM

## 2020-11-16 DIAGNOSIS — R79.89 HIGH SERUM THYROID STIMULATING HORMONE (TSH): ICD-10-CM

## 2020-11-18 LAB
A/G RATIO: 1.9 (ref 1.1–2.2)
ALBUMIN SERPL-MCNC: 4.4 G/DL (ref 3.4–5)
ALP BLD-CCNC: 97 U/L (ref 40–129)
ALT SERPL-CCNC: 28 U/L (ref 10–40)
ANION GAP SERPL CALCULATED.3IONS-SCNC: 9 MMOL/L (ref 3–16)
AST SERPL-CCNC: 20 U/L (ref 15–37)
BILIRUB SERPL-MCNC: 1 MG/DL (ref 0–1)
BUN BLDV-MCNC: 16 MG/DL (ref 7–20)
CALCIUM SERPL-MCNC: 9.5 MG/DL (ref 8.3–10.6)
CHLORIDE BLD-SCNC: 104 MMOL/L (ref 99–110)
CHOLESTEROL, TOTAL: 111 MG/DL (ref 0–199)
CO2: 29 MMOL/L (ref 21–32)
CREAT SERPL-MCNC: 1.1 MG/DL (ref 0.8–1.3)
GFR AFRICAN AMERICAN: >60
GFR NON-AFRICAN AMERICAN: >60
GLOBULIN: 2.3 G/DL
GLUCOSE BLD-MCNC: 158 MG/DL (ref 70–99)
HDLC SERPL-MCNC: 32 MG/DL (ref 40–60)
LDL CHOLESTEROL CALCULATED: 62 MG/DL
POTASSIUM SERPL-SCNC: 4.9 MMOL/L (ref 3.5–5.1)
SODIUM BLD-SCNC: 142 MMOL/L (ref 136–145)
T4 FREE: 1.2 NG/DL (ref 0.9–1.8)
TOTAL PROTEIN: 6.7 G/DL (ref 6.4–8.2)
TRIGL SERPL-MCNC: 84 MG/DL (ref 0–150)
TSH SERPL DL<=0.05 MIU/L-ACNC: 5.39 UIU/ML (ref 0.27–4.2)
VLDLC SERPL CALC-MCNC: 17 MG/DL

## 2020-11-19 LAB
ESTIMATED AVERAGE GLUCOSE: 171.4 MG/DL
HBA1C MFR BLD: 7.6 %

## 2020-11-23 ENCOUNTER — OFFICE VISIT (OUTPATIENT)
Dept: ENDOCRINOLOGY | Age: 70
End: 2020-11-23
Payer: COMMERCIAL

## 2020-11-23 VITALS
OXYGEN SATURATION: 97 % | SYSTOLIC BLOOD PRESSURE: 133 MMHG | HEIGHT: 74 IN | WEIGHT: 247.2 LBS | BODY MASS INDEX: 31.73 KG/M2 | HEART RATE: 68 BPM | DIASTOLIC BLOOD PRESSURE: 77 MMHG

## 2020-11-23 PROCEDURE — 99214 OFFICE O/P EST MOD 30 MIN: CPT | Performed by: INTERNAL MEDICINE

## 2020-11-23 PROCEDURE — 3051F HG A1C>EQUAL 7.0%<8.0%: CPT | Performed by: INTERNAL MEDICINE

## 2020-11-23 RX ORDER — GABAPENTIN 600 MG/1
TABLET ORAL
Qty: 120 TABLET | Refills: 2 | Status: SHIPPED | OUTPATIENT
Start: 2020-11-23 | End: 2021-05-13 | Stop reason: SDUPTHER

## 2020-11-23 NOTE — PROGRESS NOTES
Endocrinology  Yun Pate M.D. Phone: [phone]   FAX: [phone]       Azeb Keenan   YOB: 1950    Date of Visit:  11/23/2020    Allergies   Allergen Reactions    Morphine Other (See Comments)     Skin turns gray and feel like he's covered in ants    Morphine And Related Other (See Comments)     Feels like bugs are crawling all over it and skin gets grayish color     Outpatient Medications Marked as Taking for the 11/23/20 encounter (Office Visit) with Gaurav Valladares MD   Medication Sig Dispense Refill    BASAGLAR KWIKPEN 100 UNIT/ML injection pen INJECT 36-40 UNITS         SUBCUTANEOUSLY NIGHTLY 45 mL 3    BD PEN NEEDLE CHELSEA 2ND GEN 32G X 4 MM MISC USE AND DISCARD 1 PEN      NEEDLE INTO THE SKIN 4     TIMES A DAY (AFTER MEALS   AND AT BEDTIME) 100 each 3    HUMALOG KWIKPEN 100 UNIT/ML SOPN INJECT 10 TO 18 UNITS      SUBCUTANEOUSLY 3 TIMES A   DAY BEFORE MEALS 45 mL 3    gabapentin (NEURONTIN) 600 MG tablet TAKE ONE TABLET BY MOUTH FOUR TIMES A  tablet 0    ONETOUCH ULTRA strip USE AND DISCARD 1 TEST     STRIP 3 TIMES A DAY. 300 strip 2    simvastatin (ZOCOR) 40 MG tablet TAKE ONE TABLET BY MOUTH EVERY EVENING 90 tablet 1    Multiple Vitamins-Minerals (MULTIVITAMIN ADULT PO) Take by mouth      Liraglutide (VICTOZA) 18 MG/3ML SOPN SC injection Inject 1.2 mg into the skin daily 18 mL 3    ONETOUCH DELICA LANCETS 48Q MISC Dx Code E 10.9 300 each 2    aspirin 81 MG tablet Take 81 mg by mouth daily           Vitals:    11/23/20 0835   BP: 133/77   Site: Left Upper Arm   Position: Sitting   Cuff Size: Medium Adult   Pulse: 68   SpO2: 97%   Weight: 247 lb 3.2 oz (112.1 kg)   Height: 6' 2\" (1.88 m)     Body mass index is 31.74 kg/m².      Wt Readings from Last 3 Encounters:   11/23/20 247 lb 3.2 oz (112.1 kg)   07/13/20 246 lb 9.6 oz (111.9 kg)   03/09/20 253 lb 12.8 oz (115.1 kg)     BP Readings from Last 3 Encounters:   11/23/20 133/77   07/13/20 125/69   03/09/20 126/77        Past Medical History:   Diagnosis Date    Chronic kidney disease     Diabetic eye exam ([facility] [address] 10/06/2017    Dr Deepti Becker Hyperlipidemia 3/13/2018    Mild nonproliferative diabetic retinopathy of both eyes without macular edema associated with type 2 diabetes mellitus ([facility] 10/10/2017    Personal history of DVT (deep vein thrombosis)     Pulmonary emboli (HCC)     when hospitalized for DKA    Subclinical hypothyroidism 10/29/2018    Type 2 diabetes mellitus without complication ([facility] [facility]     Type II or unspecified type diabetes mellitus without mention of complication, not stated as uncontrolled      Past Surgical History:   Procedure Laterality Date    APPENDECTOMY      COLONOSCOPY  05/20/2010    Dr. Blu Suarez    [place] [place]      TURP  02/2016     Family History   Problem Relation Age of Onset    Cancer Mother         uterine (chemo/radiation done), leukemia 2006    Heart Surgery Father         valve replacement    Diabetes Brother         type 2     Social History     Tobacco Use   Smoking Status Never Smoker   Smokeless Tobacco Never Used      Social History     Substance and Sexual Activity   Alcohol Use Yes    Comment: social - maybe 1/day       HPI      Pedro Waite is a 72 y.o. male who is here for follow up of uncontrolled DM, HLP. PCP Nickolas Mccarthy MD       Patient has a PMH of Type 2 DM, hypertension, hyperlipidemia, DKA, BPH, PE, DVT    Diagnosed with Diabetes Mellitus  in 2004,  Course has been variable . Microvascular complications: + retinopathy s/p laser  (Last eye exam: 10/17, 10/18, 06/20 ),   No nephropathy . No Peripheral neuropathy. In 2016, he was having polyuria, polydypsia, dry mouth. Went to ER at [facility] and was found to have UTI and DKA. BS were >800. Oral meds were stopped and he was started in insulin.      Home regimen:  Currently on basaglar  40-45 units QHS  Humalog on carb ratio of 1:5 + Medium dose 1:5 for humalog.   -Continue medium dose SSI    -Continue victoza  1.2 mg daily       Retinopathy : Non-proliferative retinopathy. S/p  laser therapy in the left eye    Advised follow-up with the   urine microalbumin/cr ratio normal in 06/17, 03/18, 06/19  Discussed foot care. Follows with podiatry. BP at goal.     2. Hyperlipidemia. On statins. HDL 34---> 29---> 32--> 32  LDL 82---> 60---> 69---> 62  TGD 85----> 87---> 84---> 84    3. Subclinical hypothyroidism    Elevated TSH. Normal Free T4  Negative antibodies. Normal TSH in 06/19    TSH 4.39 in 07/20 ---> 5.38     Will monitor. 4. BPH s/p TURP. as per urology      5. DVT/PE on Xarelto. RTC 4 months.     Will see Jose C Castillo CNP

## 2020-12-14 RX ORDER — SIMVASTATIN 40 MG
TABLET ORAL
Qty: 90 TABLET | Refills: 0 | Status: SHIPPED | OUTPATIENT
Start: 2020-12-14 | End: 2021-03-30

## 2021-03-23 ENCOUNTER — OFFICE VISIT (OUTPATIENT)
Dept: ENDOCRINOLOGY | Age: 71
End: 2021-03-23
Payer: COMMERCIAL

## 2021-03-23 VITALS
OXYGEN SATURATION: 97 % | SYSTOLIC BLOOD PRESSURE: 120 MMHG | WEIGHT: 249 LBS | HEIGHT: 74 IN | BODY MASS INDEX: 31.95 KG/M2 | HEART RATE: 77 BPM | DIASTOLIC BLOOD PRESSURE: 76 MMHG

## 2021-03-23 DIAGNOSIS — E03.8 SUBCLINICAL HYPOTHYROIDISM: ICD-10-CM

## 2021-03-23 DIAGNOSIS — E10.42 TYPE 1 DIABETES MELLITUS WITH DIABETIC POLYNEUROPATHY (HCC): ICD-10-CM

## 2021-03-23 DIAGNOSIS — E11.3293 MILD NONPROLIFERATIVE DIABETIC RETINOPATHY OF BOTH EYES WITHOUT MACULAR EDEMA ASSOCIATED WITH TYPE 2 DIABETES MELLITUS (HCC): ICD-10-CM

## 2021-03-23 DIAGNOSIS — Z79.4 TYPE 2 DIABETES MELLITUS WITH DIABETIC POLYNEUROPATHY, WITH LONG-TERM CURRENT USE OF INSULIN (HCC): Primary | ICD-10-CM

## 2021-03-23 DIAGNOSIS — E78.5 HYPERLIPIDEMIA, UNSPECIFIED HYPERLIPIDEMIA TYPE: ICD-10-CM

## 2021-03-23 DIAGNOSIS — E11.42 TYPE 2 DIABETES MELLITUS WITH DIABETIC POLYNEUROPATHY, WITH LONG-TERM CURRENT USE OF INSULIN (HCC): Primary | ICD-10-CM

## 2021-03-23 LAB — HBA1C MFR BLD: 7.5 %

## 2021-03-23 PROCEDURE — 3051F HG A1C>EQUAL 7.0%<8.0%: CPT | Performed by: NURSE PRACTITIONER

## 2021-03-23 PROCEDURE — 99214 OFFICE O/P EST MOD 30 MIN: CPT | Performed by: NURSE PRACTITIONER

## 2021-03-23 PROCEDURE — 83036 HEMOGLOBIN GLYCOSYLATED A1C: CPT | Performed by: NURSE PRACTITIONER

## 2021-03-23 RX ORDER — INSULIN GLARGINE 100 [IU]/ML
30 INJECTION, SOLUTION SUBCUTANEOUS 2 TIMES DAILY
Qty: 18 PEN | Refills: 3 | Status: SHIPPED | OUTPATIENT
Start: 2021-03-23 | End: 2022-04-14 | Stop reason: SDUPTHER

## 2021-03-23 RX ORDER — LIRAGLUTIDE 6 MG/ML
1.8 INJECTION SUBCUTANEOUS DAILY
Qty: 18 ML | Refills: 5 | Status: SHIPPED | OUTPATIENT
Start: 2021-03-23 | End: 2021-03-23 | Stop reason: SDUPTHER

## 2021-03-23 RX ORDER — PEN NEEDLE, DIABETIC 32GX 5/32"
1 NEEDLE, DISPOSABLE MISCELLANEOUS
Qty: 480 EACH | Refills: 2 | Status: SHIPPED | OUTPATIENT
Start: 2021-03-23 | End: 2022-02-21 | Stop reason: SDUPTHER

## 2021-03-23 RX ORDER — LIRAGLUTIDE 6 MG/ML
1.8 INJECTION SUBCUTANEOUS DAILY
Qty: 18 ML | Refills: 5 | Status: SHIPPED | OUTPATIENT
Start: 2021-03-23 | End: 2022-08-16

## 2021-03-23 RX ORDER — LIRAGLUTIDE 6 MG/ML
1.2 INJECTION SUBCUTANEOUS DAILY
Qty: 18 ML | Refills: 5 | Status: SHIPPED | OUTPATIENT
Start: 2021-03-23 | End: 2021-03-23 | Stop reason: DRUGHIGH

## 2021-03-23 NOTE — PROGRESS NOTES
started in insulin.      Course has been variable . Microvascular complications:   Estb with CEI and is + retinopathy s/p laser  (Last eye exam: 10/17, 10/18, 06/20 ), Peripheral neuropathy. No nephropathy . Home regimen:    Injects 4 times per day  basaglar  40-45 units QHS  Humalog on carb ratio of 1:5 + Medium dose SSI   victoza  1.2 mg daily      Previous medications: metformin, glimipiride.     Blood glucose trend  Checks blood sugar four times a day  fasting 100-140  pre-lunch 100-140  pre-supper   bedtime 100-150    No significant Hypoglycemia.         Diet: Eats 3 meals/day. Nutrition education: yes  Exercise: Walks       Subclinical hypothyroidism :   TSH has been high normal.  no other symptoms suggestive of  hypothyroidism  No FH of thyroid disease.    Weight is stable. Hyperlipidemia: Currently is on Simvastatin 40 mg daily, denies myalgias and  otherwise tolerates well. Lab Results   Component Value Date    CHOL 111 11/18/2020    CHOL 118 07/06/2020    CHOL 106 06/21/2019     Lab Results   Component Value Date    TRIG 84 11/18/2020    TRIG 84 07/06/2020    TRIG 87 06/21/2019     Lab Results   Component Value Date    HDL 32 11/18/2020    HDL 32 07/06/2020    HDL 29 06/21/2019    HDL 38 11/01/2011    HDL 32 06/10/2010     Lab Results   Component Value Date    LDLCALC 62 11/18/2020    LDLCALC 69 07/06/2020    [address] 06/21/2019     No results found for: LDLDIRECT  No results found for: CHOLHDLRATIO    Vitamin D deficiency: Currently is on no supplementation. Current complaints include fatigue on daily basis. Last vitamin Dlevel is:  Lab Results   Component Value Date    VITD25 26.9 10/10/2017    VITD25 17.3 01/23/2016    VITD25 30 11/01/2011       Hypertension  Controlled , denies symptoms of dizziness, light headedness. Occasional dependent edema. Tries to follow a salt restricted diet.    Lab Results   Component Value Date     11/18/2020    K 4.9 11/18/2020     11/18/2020 CO2 29 11/18/2020    BUN 16 11/18/2020    CREATININE 1.1 11/18/2020    GLUCOSE 158 (H) 11/18/2020    CALCIUM 9.5 11/18/2020    PROT 6.7 11/18/2020    LABALBU 4.4 11/18/2020    BILITOT 1.0 11/18/2020    ALKPHOS 97 11/18/2020    AST 20 11/18/2020    ALT 28 11/18/2020    LABGLOM >60 11/18/2020    GFRAA >60 11/18/2020    AGRATIO 1.9 11/18/2020    GLOB 2.3 11/18/2020     The ASCVD Risk score (Marisol Recio, et al., 2013) failed to calculate for the following reasons: The valid total cholesterol range is 130 to 320 mg/dL      Review of Systems   Constitutional: Positive for fatigue. Negative for activity change, appetite change, diaphoresis, fever and some weight gain   HENT: Negative for dental problem. Eyes: Negative for pain and visual disturbance. Respiratory: Negative for shortness of breath. Cardiovascular: Negative for chest pain, palpitations and leg swelling. Gastrointestinal: Negative for abdominal distention, constipation, diarrhea and nausea. Endocrine: Negative for cold intolerance, heat intolerance, polydipsia, polyphagia and polyuria. Genitourinary: Negative. Negative for frequency and urgency. Musculoskeletal: Negative for arthralgias, back pain, joint swelling and myalgias. Skin: Negative. Negative for color change and pallor. Allergic/Immunologic: Negative. Neurological: Negative for dizziness, weakness, numbness and headaches. Psychiatric/Behavioral: Negative for dysphoric mood and sleep disturbance. The patient is not nervous/anxious. Vitals:    03/23/21 0831   BP: 120/76   Pulse: 77   SpO2: 97%   Weight: 249 lb (112.9 kg)   Height: 6' 2\" (1.88 m)     Physical Exam  Constitutional: Oriented to person, place, and time. well-developed. No distress. HENT:   Mouth/Throat: Oropharynx is clear and moist.   Eyes: EOM are normal.   Neck: No thyromegaly present. Cardiovascular: Normal rate and normal heart sounds.     Pulmonary/Chest: Respiratory effort is normal. No respiratory distress, no wheezes. Abdominal: Soft. Bowel sounds are normal. There is no tenderness. Musculoskeletal:  exhibits no edema. Neurological:  alert and oriented to person, place, and time. Skin: Skin is warm and dry. not diaphoretic. Psychiatric: behavior is normal. Thought content normal.     Skeletal foot exam: deferred. Assessment  Taqueria Oliver is a 72 y.o. male with uncontrolled Diabetes Mellitus type 2 complicated by retinopathy and peripheral neuropathy and associated with HTN, HLD, vitamin  D deficiency, subclinical hypothyroidism    Plan  Diabetes Mellitus Type 2  Lab Results   Component Value Date    LABA1C 7.5 03/23/2021       Goal A1c  < 6.5%  Medication : Victoza @ 1.8 mg QD  Insulin:   Basaglar 20-25 units BID; Discussed titration to FBG 90 -140 with PM dose  Humalog: continue as currently   Avoid hypoglycemia    Diet :   30-40 grams per meal , 3 meals per day, avoid carbs in snack choices  Include moderate proteins and good fats   Ensure adequate hydration and  electrolyte replacement    Exercise :  Recommended exercise is 5-7 days a week for 30-60 mins at least, per day OR a total of 2.5 hours per week , which ever is more feasible. Ambulate as possible     Diabetic Health Maintenance  Follow up with annual eye exams  Follow up with annual podiatry exams if needed  Reviewed sick day management of BG     Other areas of Diabetic Education reviewed:   Carbs: good carbs and bad carbs, importance of carb counting, incorporation of protein with each meal to reduce Glycemic index, importance of portions, Carb/insulin ratio   Fats: Good fats and bad fats, meal planning and supplements.  Discussed how food affects blood sugar readings.     Different diabetic medications   Managing high and low sugar readings   Rotation of sites for subcutaneous medication injection    Mixed Hyperlipidemia  Lab Results   Component Value Date    LDLCALC 62 11/18/2020    [address] 69 07/06/2020 [address] 60 06/21/2019     No results found for: LDLDIRECT  Lab Results   Component Value Date    TRIG 84 11/18/2020    TRIG 84 07/06/2020    TRIG 87 06/21/2019     LDL goal  < 100;   TG not elevated at 84  Continue current regimen  Managed by PCP    Essential Hypertension  Blood pressure controlled. Continue current regimen    Vitamin D deficiency  Lab Results   Component Value Date    VITD25 26.9 (L) 10/10/2017     Recheck levels  Supplement 1000 IU QD    Problem List Items Addressed This Visit     Subclinical hypothyroidism    Relevant Orders    T4, Free    TSH without Reflex    Mild nonproliferative diabetic retinopathy of both eyes without macular edema associated with type 2 diabetes mellitus (HCC)    Relevant Medications    Liraglutide (VICTOZA) 18 MG/3ML SOPN SC injection    BASAGLAR KWIKPEN 100 UNIT/ML injection pen    Hyperlipidemia    Diabetes mellitus ([address] - Primary    Relevant Medications    Liraglutide (VICTOZA) 18 MG/3ML SOPN SC injection    BASAGLAR KWIKPEN 100 UNIT/ML injection pen    Insulin Pen Needle (BD PEN NEEDLE CHELSEA 2ND GEN) 32G X 4 MM MISC    Other Relevant Orders    POCT glycosylated hemoglobin (Hb A1C) (Completed)    T4, Free    TSH without Reflex    Lipid Panel    Comprehensive Metabolic Panel    Microalbumin / Creatinine Urine Ratio        Greater than 30 minutes spent directly counseling patient about topics listed above (such as lifestyle modifications, preventative screenings and/or disease related processes. Return in about 6 months (around 9/23/2021).

## 2021-03-30 ENCOUNTER — OFFICE VISIT (OUTPATIENT)
Dept: INTERNAL MEDICINE CLINIC | Age: 71
End: 2021-03-30
Payer: COMMERCIAL

## 2021-03-30 VITALS
OXYGEN SATURATION: 95 % | HEIGHT: 74 IN | SYSTOLIC BLOOD PRESSURE: 122 MMHG | HEART RATE: 78 BPM | BODY MASS INDEX: 32.06 KG/M2 | WEIGHT: 249.8 LBS | DIASTOLIC BLOOD PRESSURE: 76 MMHG | TEMPERATURE: 97.7 F

## 2021-03-30 DIAGNOSIS — E11.42 TYPE 2 DIABETES MELLITUS WITH DIABETIC POLYNEUROPATHY, WITH LONG-TERM CURRENT USE OF INSULIN (HCC): ICD-10-CM

## 2021-03-30 DIAGNOSIS — Z79.4 TYPE 2 DIABETES MELLITUS WITH DIABETIC POLYNEUROPATHY, WITH LONG-TERM CURRENT USE OF INSULIN (HCC): ICD-10-CM

## 2021-03-30 DIAGNOSIS — Z00.00 ROUTINE GENERAL MEDICAL EXAMINATION AT A HEALTH CARE FACILITY: Primary | ICD-10-CM

## 2021-03-30 DIAGNOSIS — I48.0 PAROXYSMAL ATRIAL FIBRILLATION (HCC): ICD-10-CM

## 2021-03-30 DIAGNOSIS — E78.5 HYPERLIPIDEMIA, UNSPECIFIED HYPERLIPIDEMIA TYPE: ICD-10-CM

## 2021-03-30 DIAGNOSIS — E03.8 SUBCLINICAL HYPOTHYROIDISM: ICD-10-CM

## 2021-03-30 DIAGNOSIS — Z86.711 HISTORY OF PULMONARY EMBOLUS (PE): ICD-10-CM

## 2021-03-30 PROCEDURE — 99397 PER PM REEVAL EST PAT 65+ YR: CPT | Performed by: INTERNAL MEDICINE

## 2021-03-30 RX ORDER — SIMVASTATIN 40 MG
TABLET ORAL
Qty: 90 TABLET | Refills: 3 | Status: SHIPPED | OUTPATIENT
Start: 2021-03-30 | End: 2022-05-13

## 2021-03-30 RX ORDER — SIMVASTATIN 40 MG
TABLET ORAL
Qty: 90 TABLET | Refills: 1 | Status: SHIPPED | OUTPATIENT
Start: 2021-03-30 | End: 2021-03-30 | Stop reason: SDUPTHER

## 2021-03-30 ASSESSMENT — PATIENT HEALTH QUESTIONNAIRE - PHQ9
SUM OF ALL RESPONSES TO PHQ QUESTIONS 1-9: 0
SUM OF ALL RESPONSES TO PHQ QUESTIONS 1-9: 0
1. LITTLE INTEREST OR PLEASURE IN DOING THINGS: 0
2. FEELING DOWN, DEPRESSED OR HOPELESS: 0
SUM OF ALL RESPONSES TO PHQ QUESTIONS 1-9: 0
SUM OF ALL RESPONSES TO PHQ9 QUESTIONS 1 & 2: 0

## 2021-03-30 NOTE — PROGRESS NOTES
3/30/2021    Candy Lala (:  1950) kelsie 72 y.o. male, here for a preventive medicine evaluation. Patient Active Problem List   Diagnosis    DKA (diabetic ketoacidoses) (HCC)    Paroxysmal atrial fibrillation (HCC)    History of pulmonary embolus (PE)    DVT (deep venous thrombosis) (HCC)    Diabetes mellitus ([facility] [place] [address]    History of thrombosis    Mild nonproliferative diabetic retinopathy of both eyes without macular edema associated with type 2 diabetes mellitus ([facility] [place] [address]    Hyperlipidemia    Subclinical hypothyroidism     Here for physical.    He is due for colonoscopy, will schedule. He sees endocrinologist for diabetes management. Sugars have overall been stable. Denies any palpitations, chest pain, shortness of breath. Review of Systems   Constitutional: Negative for fatigue and unexpected weight change. HENT: Negative for congestion. Eyes: Negative for visual disturbance. Respiratory: Negative for cough and shortness of breath. Cardiovascular: Negative for chest pain and leg swelling. Gastrointestinal: Negative for abdominal pain, constipation and diarrhea. Endocrine: Negative for polydipsia and polyuria. Genitourinary: Negative for difficulty urinating and dysuria. Musculoskeletal: Negative for joint swelling and myalgias. Skin: Negative for rash. Neurological: Negative for weakness. Psychiatric/Behavioral: Negative for dysphoric mood. The patient is not nervous/anxious. Prior to Visit Medications    Medication Sig Taking?  Authorizing Provider   simvastatin (ZOCOR) 40 MG tablet TAKE ONE TABLET BY MOUTH EVERY EVENING Yes Khanh Giles MD   Liraglutide (VICTOZA) 18 MG/3ML SOPN SC injection Inject 1.8 mg into the skin daily Yes MARIPOSA Hong CNP   BASAGLAR KWIKPEN 100 UNIT/ML injection pen Inject 30 Units into the skin 2 times daily Yes MARIOPSA Hong CNP   Insulin Pen Needle (BD PEN NEEDLE CHELSEA 2ND GEN) 32G X 4 MM MISC Inject 1 each into the Needs    Financial resource strain: Not on file    Food insecurity     Worry: Not on file     Inability: Not on file    Transportation needs     Medical: Not on file     Non-medical: Not on file   Tobacco Use    Smoking status: Never Smoker    Smokeless tobacco: Never Used   Substance and Sexual Activity    Alcohol use: Yes     Comment: social - maybe 1/day    Drug use: No    Sexual activity: Not on file   Lifestyle    Physical activity     Days per week: Not on file     Minutes per session: Not on file    Stress: Not on file   Relationships    Social connections     Talks on phone: Not on file     Gets together: Not on file     Attends Baptism service: Not on file     Active member of club or organization: Not on file     Attends meetings of clubs or organizations: Not on file     Relationship status: Not on file    Intimate partner violence     Fear of current or ex partner: Not on file     Emotionally abused: Not on file     Physically abused: Not on file     Forced sexual activity: Not on file   Other Topics Concern    Not on file   Social History Narrative    Not on file        Family History   Problem Relation Age of Onset    Cancer Mother         uterine (chemo/radiation done), leukemia 2006    Heart Surgery Father         valve replacement    Diabetes Brother         type 2       ADVANCEDIRECTIVE: N, <no information>    Vitals:    03/30/21 1117   BP: 122/76   Site: Left Upper Arm   Pulse: 78   Temp: 97.7 °F (36.5 °C)   SpO2: 95%   Weight: 249 lb 12.8 oz (113.3 kg)   Height: 6' 2\" (1.88 m)     Estimated body mass index is 32.07 kg/m² as calculated from the following:    Height as of this encounter: 6' 2\" (1.88 m). Weight as of this encounter: 249 lb 12.8 oz (113.3 kg). Physical Exam    No flowsheet data found.     Lab Results   Component Value Date    CHOL 111 11/18/2020    CHOL 118 07/06/2020    CHOL 106 06/21/2019    TRIG 84 11/18/2020    TRIG 84 07/06/2020    TRIG 87 06/21/2019 medication    3. Subclinical hypothyroidism  - asymptomatic, monitoring TSH    4. Paroxysmal atrial fibrillation (HCC)  - history of MAT vs paroxysmal a fib in the setting of illness, last holter showed no a fib, previously saw cardiology, monitor. On ASA 81mg    5. Hyperlipidemia, unspecified hyperlipidemia type  - continue simvastatin 40mg daily    6. History of pulmonary embolus (PE)  - previously treated with Xarelto      Return in about 1 year (around 3/30/2022) for CPE.

## 2021-03-31 ASSESSMENT — ENCOUNTER SYMPTOMS
ABDOMINAL PAIN: 0
CONSTIPATION: 0
DIARRHEA: 0
COUGH: 0
SHORTNESS OF BREATH: 0

## 2021-05-12 ENCOUNTER — OFFICE VISIT (OUTPATIENT)
Dept: DERMATOLOGY | Age: 71
End: 2021-05-12
Payer: COMMERCIAL

## 2021-05-12 VITALS — TEMPERATURE: 97.3 F

## 2021-05-12 DIAGNOSIS — L82.1 SEBORRHEIC KERATOSES: ICD-10-CM

## 2021-05-12 DIAGNOSIS — D48.5 NEOPLASM OF UNCERTAIN BEHAVIOR OF SKIN: Primary | ICD-10-CM

## 2021-05-12 DIAGNOSIS — L57.0 KERATOSIS, ACTINIC: ICD-10-CM

## 2021-05-12 PROCEDURE — 11102 TANGNTL BX SKIN SINGLE LES: CPT | Performed by: DERMATOLOGY

## 2021-05-12 PROCEDURE — 17000 DESTRUCT PREMALG LESION: CPT | Performed by: DERMATOLOGY

## 2021-05-12 PROCEDURE — 11103 TANGNTL BX SKIN EA SEP/ADDL: CPT | Performed by: DERMATOLOGY

## 2021-05-12 PROCEDURE — 99202 OFFICE O/P NEW SF 15 MIN: CPT | Performed by: DERMATOLOGY

## 2021-05-12 PROCEDURE — 17003 DESTRUCT PREMALG LES 2-14: CPT | Performed by: DERMATOLOGY

## 2021-05-12 NOTE — PROGRESS NOTES
[facility]) Dermatology  Willa Patel M.D.  [phone]       Gracia Marsh  1950    72 y.o. male     Date of Visit: 5/12/2021    Chief Complaint:   Chief Complaint   Patient presents with    Skin Lesion     spot on scalp enlargening and tender         I was asked to see this patient by Dr. Mcbride ref. provider found. History of Present Illness:  1. Patient presents today for evaluation of an enlarging tender plaque on the vertex of his scalp. Has a history of actinic keratoses. No history of skin cancer. States that he has had a scaly plaque in that area for \" a while\" but in the last 4 weeks it has become tender. Has multiple other dry papules on the vertex of his scalp    Erythematous papule left nasal bridge. Remembers a an injury while playing football in high school in that area-now remains erythematous. Sometimes become scaly and patient cuts the superficial scale off. Does not think it has enlarged. Not itching or bleeding      Skin history:  No personal or family history of skin cancer. Does wear a hat    + History of actinic keratoses-liquid nitrogen    Review of Systems:  Constitutional: Reports general sense of well-being       Past Medical History, Surgical History, Family History, Medications and Allergies reviewed.     Social History:   Social History     Socioeconomic History    Marital status:      Spouse name: Not on file    Number of children: Not on file    Years of education: Not on file    Highest education level: Not on file   Occupational History    Not on file   Social Needs    Financial resource strain: Not on file    Food insecurity     Worry: Not on file     Inability: Not on file    Transportation needs     Medical: Not on file     Non-medical: Not on file   Tobacco Use    Smoking status: Never Smoker    Smokeless tobacco: Never Used   Substance and Sexual Activity    Alcohol use: Yes     Comment: social - maybe 1/day    Drug use: No    Sexual activity: Not on file   Lifestyle    Physical activity     Days per week: Not on file     Minutes per session: Not on file    Stress: Not on file   Relationships    Social connections     Talks on phone: Not on file     Gets together: Not on file     Attends Zoroastrianism service: Not on file     Active member of club or organization: Not on file     Attends meetings of clubs or organizations: Not on file     Relationship status: Not on file    Intimate partner violence     Fear of current or ex partner: Not on file     Emotionally abused: Not on file     Physically abused: Not on file     Forced sexual activity: Not on file   Other Topics Concern    Not on file   Social History Narrative    Not on file       Physical Examination       -General: Well-appearing, NAD  1. Limited exam multiple gritty erythematous papules vertex of scalp. Scattered hyperkeratotic stuck on papules-seborrheic keratoses    Vertex of scalp 1.2 cm keratotic plaque rule out squamous cell carcinoma  Left nasal bridge 6 mm erythematous papule-rule out basal cell carcinoma, sebaceous hyperplasia, scar                Assessment and Plan     1. Neoplasm of uncertain behavior of skin-vertex of scalp, left nasal bridge--Discussed possible diagnosis. Patient agreeable to biopsy. Verbal consent obtained after risks (infection, bleeding, scar), benefits and alternatives explained. -Area(s) to be biopsied were marked with a surgical pen. Site(s) were cleansed with alcohol. Local anesthesia achieved with 1% lidocaine with epinephrine/sodium bicarbonate. Shave biopsy performed with a razor blade. Hemostasis was achieved with aluminum chloride. The wound(s) were dressed with petrolatum and covered with a bandage. Specimen(s) sent to pathology. Pt educated re: risk of bleeding, infection, scar and wound care instructions. 2. Keratosis, actinic -6-vertex of scalp lesion(s) treated w/ liquid nitrogen.  Edu re: risk of blister formation, discomfort, scar, hypopigmentation. Discussed wound care. 3. Seborrheic keratoses - Discussed underlying nature of seborrheic keratosis and low risk of malignancy. Treatment reserved for lesions that are itching, bleeding, growing or otherwise becoming bothersome. Discussed monitoring for change with reevaluation for changing lesions. Addendum: Inflamed seborrheic keratosis vertex of scalp also treated with liquid nitrogen-no charge.

## 2021-05-13 DIAGNOSIS — E11.42 TYPE 2 DIABETES MELLITUS WITH DIABETIC POLYNEUROPATHY, WITH LONG-TERM CURRENT USE OF INSULIN (HCC): Primary | ICD-10-CM

## 2021-05-13 DIAGNOSIS — Z79.4 TYPE 2 DIABETES MELLITUS WITH DIABETIC POLYNEUROPATHY, WITH LONG-TERM CURRENT USE OF INSULIN (HCC): Primary | ICD-10-CM

## 2021-05-13 RX ORDER — GABAPENTIN 600 MG/1
TABLET ORAL
Qty: 120 TABLET | Refills: 2 | Status: SHIPPED | OUTPATIENT
Start: 2021-05-13 | End: 2021-10-11

## 2021-05-13 NOTE — TELEPHONE ENCOUNTER
Medication:   Requested Prescriptions     Pending Prescriptions Disp Refills    gabapentin (NEURONTIN) 600 MG tablet 120 tablet 2     Sig: Take 1 tablet 4 times a day         Last appt: 3/23/2021   Next appt: 9/21/2021    Last Labs DM:   Lab Results   Component Value Date    LABA1C 7.5 03/23/2021

## 2021-05-14 LAB — DERMATOLOGY PATHOLOGY REPORT: NORMAL

## 2021-05-19 ENCOUNTER — OFFICE VISIT (OUTPATIENT)
Dept: DERMATOLOGY | Age: 71
End: 2021-05-19
Payer: COMMERCIAL

## 2021-05-19 VITALS — TEMPERATURE: 97.2 F

## 2021-05-19 DIAGNOSIS — L57.0 KERATOSIS, ACTINIC: Primary | ICD-10-CM

## 2021-05-19 DIAGNOSIS — L57.0 ACTINIC KERATOSIS TREATED WITH TOPICAL FLUOROURACIL (5FU): ICD-10-CM

## 2021-05-19 PROCEDURE — 99214 OFFICE O/P EST MOD 30 MIN: CPT | Performed by: DERMATOLOGY

## 2021-05-19 PROCEDURE — 17000 DESTRUCT PREMALG LESION: CPT | Performed by: DERMATOLOGY

## 2021-05-19 NOTE — PROGRESS NOTES
[facility]) Dermatology  Demian Vaughn M.D.  [phone]       Kya Pryor  1950    72 y.o. male     Date of Visit: 5/19/2021    Chief Complaint:   Chief Complaint   Patient presents with    Follow-up     LN2 scalp        I was asked to see this patient by Dr. Mcbride ref. provider found. History of Present Illness:  1. Patient presents today for follow-up of a hypertrophic actinic keratosis with follicular extension biopsied vertex of scalp 5/14. Patient healing without difficulty. Multiple other actinic keratoses were treated with liquid nitrogen at the same time. Review of Systems:  Constitutional: Reports general sense of well-being       Past Medical History, Surgical History, Family History, Medications and Allergies reviewed. Social History:   Social History     Socioeconomic History    Marital status:      Spouse name: Not on file    Number of children: Not on file    Years of education: Not on file    Highest education level: Not on file   Occupational History    Not on file   Tobacco Use    Smoking status: Never Smoker    Smokeless tobacco: Never Used   Vaping Use    Vaping Use: Never used   Substance and Sexual Activity    Alcohol use: Yes     Comment: social - maybe 1/day    Drug use: No    Sexual activity: Not on file   Other Topics Concern    Not on file   Social History Narrative    Not on file     Social Determinants of Health     Financial Resource Strain:     Difficulty of Paying Living Expenses:    Food Insecurity:     Worried About Running Out of Food in the Last Year:     [address] in the Last Year:    Transportation Needs:     Lack of Transportation (Medical):      Lack of Transportation (Non-Medical):    Physical Activity:     Days of Exercise per Week:     Minutes of Exercise per Session:    Stress:     Feeling of Stress :    Social Connections:     Frequency of Communication with Friends and Family:     Frequency of Social Gatherings with Friends and Family:     Attends Hindu Services:     Active Member of Clubs or Organizations:     Attends Club or Organization Meetings:     Marital Status:    Intimate Partner Violence:     Fear of Current or Ex-Partner:     Emotionally Abused:     Physically Abused:     Sexually Abused:        Physical Examination       -General: Well-appearing, NAD  1. Granulating biopsy site-site confirmed with patient and prior photograph  Multiple surrounding crusted papules-actinic keratoses previously treated      Assessment and Plan     1. Keratosis, actinic -1-vertex of scalp lesion(s) treated w/ liquid nitrogen. Edu re: risk of blister formation, discomfort, scar, hypopigmentation. Discussed wound care. 2. Actinic keratosis treated with topical fluorouracil (5FU)-discussed field therapy-Efudex 5% cream twice daily for 2 weeks to the entire vertex of his scalp. Will defer until fall so that he can practice sun protection/avoidance while using Efudex.      TBSE July

## 2021-06-12 DIAGNOSIS — E10.42 TYPE 1 DIABETES MELLITUS WITH DIABETIC POLYNEUROPATHY (HCC): ICD-10-CM

## 2021-06-14 RX ORDER — BLOOD SUGAR DIAGNOSTIC
STRIP MISCELLANEOUS
Qty: 300 STRIP | Refills: 2 | Status: SHIPPED | OUTPATIENT
Start: 2021-06-14 | End: 2022-10-10 | Stop reason: SDUPTHER

## 2021-09-10 DIAGNOSIS — E10.42 TYPE 1 DIABETES MELLITUS WITH DIABETIC POLYNEUROPATHY (HCC): Primary | ICD-10-CM

## 2021-10-11 DIAGNOSIS — Z79.4 TYPE 2 DIABETES MELLITUS WITH DIABETIC POLYNEUROPATHY, WITH LONG-TERM CURRENT USE OF INSULIN (HCC): ICD-10-CM

## 2021-10-11 DIAGNOSIS — E11.42 TYPE 2 DIABETES MELLITUS WITH DIABETIC POLYNEUROPATHY, WITH LONG-TERM CURRENT USE OF INSULIN (HCC): ICD-10-CM

## 2021-10-11 RX ORDER — GABAPENTIN 600 MG/1
TABLET ORAL
Qty: 120 TABLET | Refills: 2 | Status: SHIPPED | OUTPATIENT
Start: 2021-10-11 | End: 2022-04-13 | Stop reason: SDUPTHER

## 2021-10-11 NOTE — TELEPHONE ENCOUNTER
Requested Prescriptions     Pending Prescriptions Disp Refills    gabapentin (NEURONTIN) 600 MG tablet [Pharmacy Med Name: GABAPENTIN 600 MG TABLET] 120 tablet 2     Sig: TAKE ONE TABLET BY MOUTH FOUR TIMES A DAY         LOV: 03/23/2021  NOV:none    LABS:09/17/2021  LAST REFILL: 05/13/2021

## 2021-10-11 NOTE — TELEPHONE ENCOUNTER
Tanja Ag, this appears to be your patient. Please advise regarding refills. Has no appointment scheduled.

## 2021-11-04 ENCOUNTER — TELEPHONE (OUTPATIENT)
Dept: ENDOCRINOLOGY | Age: 71
End: 2021-11-04

## 2021-11-04 DIAGNOSIS — E10.42 TYPE 1 DIABETES MELLITUS WITH DIABETIC POLYNEUROPATHY (HCC): ICD-10-CM

## 2021-11-04 RX ORDER — INSULIN LISPRO 100 [IU]/ML
INJECTION, SOLUTION INTRAVENOUS; SUBCUTANEOUS
Qty: 45 ML | Refills: 3 | Status: SHIPPED | OUTPATIENT
Start: 2021-11-04

## 2021-11-04 NOTE — TELEPHONE ENCOUNTER
Patient needs a refill of his HUMALOG [facility] 100 UNIT/ML SOPN              [address], [address] [phone] - f [phone]   [place] [address]   Phone:  [phone]  Fax:  [phone]

## 2021-11-04 NOTE — TELEPHONE ENCOUNTER
Medication:   Requested Prescriptions     Pending Prescriptions Disp Refills    insulin lispro, 1 Unit Dial, (HUMALOG KWIKPEN) 100 UNIT/ML SOPN 45 mL 3     Sig: INJECT 10 TO 18 UNITS      SUBCUTANEOUSLY 3 TIMES A   DAY BEFORE MEALS         Last appt: 3/23/2021   Next appt: 11/11/2021    Last Labs DM:   Lab Results   Component Value Date    LABA1C 7.7 09/17/2021

## 2022-02-17 ENCOUNTER — TELEPHONE (OUTPATIENT)
Dept: ENDOCRINOLOGY | Age: 72
End: 2022-02-17

## 2022-02-17 NOTE — TELEPHONE ENCOUNTER
Patient needs a refill of his Insulin Pen Needle (BD PEN NEEDLE CHELSEA 2ND GEN) 32G X 4 MM [facility] [address], [address] [facility] [phone] - f [phone]   [address] [place] [address]   Phone:  [phone]  Fax:  [phone]

## 2022-02-17 NOTE — TELEPHONE ENCOUNTER
Medication:   Requested Prescriptions      No prescriptions requested or ordered in this encounter       Last Filled:      Patient Phone Number: [phone] (home)     Last appt: Visit date not found   Next appt: Visit date not found    Last Labs DM:   Lab Results   Component Value Date    LABA1C 7.7 09/17/2021

## 2022-02-21 DIAGNOSIS — Z79.4 TYPE 2 DIABETES MELLITUS WITH DIABETIC POLYNEUROPATHY, WITH LONG-TERM CURRENT USE OF INSULIN (HCC): ICD-10-CM

## 2022-02-21 DIAGNOSIS — E11.42 TYPE 2 DIABETES MELLITUS WITH DIABETIC POLYNEUROPATHY, WITH LONG-TERM CURRENT USE OF INSULIN (HCC): ICD-10-CM

## 2022-02-21 RX ORDER — PEN NEEDLE, DIABETIC 32GX 5/32"
1 NEEDLE, DISPOSABLE MISCELLANEOUS
Qty: 480 EACH | Refills: 2 | Status: SHIPPED | OUTPATIENT
Start: 2022-02-21

## 2022-03-16 ENCOUNTER — OFFICE VISIT (OUTPATIENT)
Dept: ENDOCRINOLOGY | Age: 72
End: 2022-03-16
Payer: COMMERCIAL

## 2022-03-16 VITALS
WEIGHT: 257 LBS | SYSTOLIC BLOOD PRESSURE: 133 MMHG | DIASTOLIC BLOOD PRESSURE: 80 MMHG | RESPIRATION RATE: 16 BRPM | HEIGHT: 74 IN | BODY MASS INDEX: 32.98 KG/M2 | HEART RATE: 73 BPM

## 2022-03-16 DIAGNOSIS — Z79.4 TYPE 2 DIABETES MELLITUS WITH DIABETIC POLYNEUROPATHY, WITH LONG-TERM CURRENT USE OF INSULIN (HCC): ICD-10-CM

## 2022-03-16 DIAGNOSIS — E11.42 TYPE 2 DIABETES MELLITUS WITH DIABETIC POLYNEUROPATHY, WITH LONG-TERM CURRENT USE OF INSULIN (HCC): ICD-10-CM

## 2022-03-16 DIAGNOSIS — E03.8 SUBCLINICAL HYPOTHYROIDISM: ICD-10-CM

## 2022-03-16 DIAGNOSIS — I10 ESSENTIAL HYPERTENSION: ICD-10-CM

## 2022-03-16 DIAGNOSIS — E78.5 HYPERLIPIDEMIA, UNSPECIFIED HYPERLIPIDEMIA TYPE: ICD-10-CM

## 2022-03-16 LAB — HBA1C MFR BLD: 7.8 %

## 2022-03-16 PROCEDURE — 83036 HEMOGLOBIN GLYCOSYLATED A1C: CPT | Performed by: INTERNAL MEDICINE

## 2022-03-16 PROCEDURE — 99215 OFFICE O/P EST HI 40 MIN: CPT | Performed by: INTERNAL MEDICINE

## 2022-03-16 NOTE — PATIENT INSTRUCTIONS
Patient Education        empagliflozin  Pronunciation:  EM pa gli FLOE zin  Brand:  Carlee Thomas  What is the most important information I should know about empagliflozin? You should not use empagliflozin if you have severe kidney disease, or if you are on dialysis. Taking empagliflozin can make you dehydrated, which could cause you to feel weak or dizzy (especially when you stand up). Empagliflozin can cause serious infections in the penis or vagina. Get medical help right away if you have burning, itching, odor, discharge, pain, tenderness, redness or swelling of the genital or rectal area, fever, or if you don't feel well. What is empagliflozin? Empagliflozin is used together with diet and exercise to improve blood sugar control in adults with type 2 diabetes mellitus. Empagliflozin is also used to lower the risk of death from heart attack, stroke, or heart failure in adults with type 2 diabetes who also have heart disease. Empagliflozin is not for treating type 1 diabetes. Empagliflozin may also be used for purposes not listed in this medication guide. What should I discuss with my healthcare provider before taking empagliflozin? You should not use empagliflozin if you are allergic to it, or if you have:  · severe kidney disease (or if you are on dialysis). Tell your doctor if you have ever had:  · liver or kidney disease;  · bladder infections or other urination problems;  · low blood pressure;  · heart problems;  · problems with your pancreas, including surgery;  · if you drink alcohol often; or  · if you are on a low salt diet. Follow your doctor's instructions about using this medicine if you are pregnant or you become pregnant. Controlling diabetes is very important during pregnancy, and having high blood sugar may cause complications in both the mother and the baby. You should not use empagliflozin during the second or third trimester of pregnancy.    You should not breastfeed while using this medicine. Empagliflozin is not approved for use by anyone younger than 25years old. How should I take empagliflozin? Follow all directions on your prescription label and read all medication guides or instruction sheets. Your doctor may occasionally change your dose. Use the medicine exactly as directed. You may take empagliflozin with or without food. Call your doctor if you are sick with vomiting or diarrhea, if you consume less food or fluid than usual, or if you are sweating more than usual.  Your blood sugar will need to be checked often, and you may also need to test the level of ketones your urine. Empagliflozin can cause life-threatening ketoacidosis (too much acid in the blood). Even if your blood sugar is normal, contact your doctor if a urine test shows that you have ketones in the urine. You may have low blood sugar (hypoglycemia) and feel very hungry, dizzy, irritable, confused, anxious, or shaky. To quickly treat hypoglycemia, eat or drink a fast-acting source of sugar (fruit juice, hard candy, crackers, raisins, or non-diet soda). Your doctor may prescribe a glucagon injection kit in case you have severe hypoglycemia. Be sure your family or close friends know how to give you this injection in an emergency. Also watch for signs of high blood sugar (hyperglycemia) such as increased thirst or urination. Blood sugar levels can be affected by stress, illness, surgery, exercise, alcohol use, or skipping meals. Ask your doctor before changing your dose or medication schedule. This medicine can affect the results of certain medical tests. Tell any doctor who treats you that you are using empagliflozin. Empagliflozin is only part of a treatment program that may also include diet, exercise, weight control, blood sugar testing, and special medical care. Follow your doctor's instructions very closely. Store at room temperature away from moisture and heat. What happens if I miss a dose?   Take the medicine as soon as you can, but skip the missed dose if it is almost time for your next dose. Do not take two doses at one time. What happens if I overdose? Seek emergency medical attention or call the Poison Help line at 1-754.607.9555. What should I avoid while taking empagliflozin? Avoid getting up too fast from a sitting or lying position, or you may feel dizzy. What are the possible side effects of empagliflozin? Get emergency medical help if you have signs of an allergic reaction: hives; difficult breathing; swelling of your face, lips, tongue, or throat. Seek medical attention right away if you have signs of a genital infection (penis or vagina): burning, itching, odor, discharge, pain, tenderness, redness or swelling of the genital or rectal area, fever, not feeling well. These symptoms may get worse quickly. Call your doctor at once if you have:  · little or no urination;  · dehydration symptoms --dizziness, weakness, feeling light-headed (like you might pass out);  · ketoacidosis (too much acid in the blood) --nausea, vomiting, stomach pain, confusion, unusual drowsiness, or trouble breathing; or  · signs of a bladder infection --pain or burning when you urinate, increased urination, blood in your urine, fever, pain in your pelvis or back. Older adults may be more likely to have side effects from this medicine. Common side effects may include:  · bladder infection; or  · vaginal yeast infection. This is not a complete list of side effects and others may occur. Call your doctor for medical advice about side effects. You may report side effects to FDA at 9-032-YJD-7076. What other drugs will affect empagliflozin? Tell your doctor about all your other medicines, especially:  · insulin or other oral diabetes medications; or  · a diuretic or \"water pill. \"  This list is not complete.  Other drugs may affect empagliflozin, including prescription and over-the-counter medicines, vitamins, and herbal products. Not all possible drug interactions are listed here. Where can I get more information? Your pharmacist can provide more information about empagliflozin. Remember, keep this and all other medicines out of the reach of children, never share your medicines with others, and use this medication only for the indication prescribed. Every effort has been made to ensure that the information provided by [address] is accurate, up-to-date, and complete, but no guarantee is made to that effect. Drug information contained herein may be time sensitive. [facility] information has been compiled for use by healthcare practitioners and consumers in the [place] and therefore [facility] does not warrant that uses outside of the [place] are appropriate, unless specifically indicated otherwise. [facility]'s drug information does not endorse drugs, diagnose patients or recommend therapy. [facility]'s drug information is an informational resource designed to assist licensed healthcare practitioners in caring for their patients and/or to serve consumers viewing this service as a supplement to, and not a substitute for, the expertise, skill, knowledge and judgment of healthcare practitioners. The absence of a warning for a given drug or drug combination in no way should be construed to indicate that the drug or drug combination is safe, effective or appropriate for any given patient. [facility] does not assume any responsibility for any aspect of healthcare administered with the aid of information [facility] provides. The information contained herein is not intended to cover all possible uses, directions, precautions, warnings, drug interactions, allergic reactions, or adverse effects. If you have questions about the drugs you are taking, check with your doctor, nurse or pharmacist.  Copyright 2194-1192 [address] [place] [address] Dr HARE. Version: 3.02. Revision date: 3/31/2020. Care instructions adapted under license by [facility] ([facility]). If you have questions about a medical condition or this instruction, always ask your healthcare professional. [place] [address] any warranty or liability for your use of this information.

## 2022-03-16 NOTE — PROGRESS NOTES
Lajuanda Lundborg is a 80 y.o. male who is a new patient following up for management of Diabetes Mellitus Type 2. Patient has a PMH of Type 2 DM, hypertension, hyperlipidemia, DKA, BPH, PE, DVT     Diagnosed with Diabetes Mellitus  in 2004,    In 2016, he was having polyuria, polydypsia, dry mouth. Went to ER at [facility] and was found to have UTI and DKA. BS were >800. Oral meds were stopped and he was started in insulin.      Course has been variable . Microvascular complications:   Estb with CEI and is + retinopathy s/p laser  (Last eye exam: 10/17, 10/18, 06/20 ), Peripheral neuropathy. No nephropathy . Home regimen:    Injects 4 times per day  basaglar 50 units  Humalog on carb ratio of 1:5 + Medium dose SSI   victoza  1.2 mg daily      Previous medications: metformin, glimipiride.     Subclinical hypothyroidism :   TSH has been high normal.  no other symptoms suggestive of  hypothyroidism  No FH of thyroid disease.    Weight is stable. Hyperlipidemia: Currently is on Simvastatin 40 mg daily, denies myalgias and  otherwise tolerates well. Vitamin D deficiency: Currently is on no supplementation. Current complaints include fatigue on daily basis. Hypertension  Controlled , denies symptoms of dizziness, light headedness. Occasional dependent edema. Tries to follow a salt restricted diet.          Tobacco/Alcohol History:   Tobacco Use    Smoking status: Never Smoker    Smokeless tobacco: Never Used   Substance and Sexual Activity    Alcohol use: Yes     Comment: social - maybe 1/day       PMH includes  Past Medical History:   Diagnosis Date    Chronic kidney disease     Diabetic eye exam ([facility] [address] 10/06/2017    Dr Samuel Romano Hyperlipidemia 3/13/2018    Mild nonproliferative diabetic retinopathy of both eyes without macular edema associated with type 2 diabetes mellitus ([address] 10/10/2017    Personal history of DVT (deep vein thrombosis)     Pulmonary emboli ([address]     when hospitalized for DKA  Subclinical hypothyroidism 10/29/2018    Type 2 diabetes mellitus without complication (HCC)     Type II or unspecified type diabetes mellitus without mention of complication, not stated as uncontrolled      Family History   Problem Relation Age of Onset    Cancer Mother         uterine (chemo/radiation done), leukemia 2006    Heart Surgery Father         valve replacement    Diabetes Brother         type 2           Vitals:    03/16/22 0958   BP: 133/80   Pulse: 73   Resp: 16   Weight: 257 lb (116.6 kg)   Height: 6' 2\" (1.88 m)     ROS  I have reviewed the review of system questionnaire filled by the patient .   Patient was advised to contact PCP for non endocrine signs and symptoms       EXAM   Constitutional: no acute distress, well appearing, well nourished  Psychiatric: oriented to person, place and time, judgement, insight and normal, recent and remote memory and intact and mood, affect are normal  Skin: skin and subcutaneous tissue is normal without mass,   Head and Face: examination of head and face revealed no abnormalities  Eyes: no lid or conjunctival swelling, no erythema or discharge, pupils are normal,   Ears/Nose: external inspection of ears and nose revealed no abnormalities, hearing is grossly normal  Oropharynx/Mouth/Face: lips, tongue and gums are normal with no lesions, the voice quality was normal  Neck: neck is supple and symmetric, with midline trachea and no masses, thyroid is normal    Pulmonary: no increased work of breathing or signs of respiratory distress, lungs are clear to auscultation  Cardiovascular: normal heart rate and rhythm, normal S1 and S2,   Musculoskeletal: normal gait and station,   Neurological: normal coordination, normal general cortical function      Assessment  Braden Hsieh is a 80 y.o. male with uncontrolled Diabetes Mellitus type 2 complicated by retinopathy and peripheral neuropathy and associated with HTN, HLD, vitamin  D deficiency, subclinical hypothyroidism    Plan  1--unocntrolled Diabetes Mellitus Type 2 after his DKA in 2016 he had one low C-peptide reading but his repeat C-peptide reading was 4.6 in 2019, with negative janes antibodies    Goal A1c  < 6.5%  Medication : Victoza @ 1.8 mg QD discussed weekly option with him in detail he wants to stay on the dame dose for now  Insulin:   Basaglar 50 units switch to am to improve compliance  ; Discussed titration to  ---190  Increase to 55 units   Humalog: sliding scale + CIR 5:1   We discussed adding SGLT2 inhibitors I gave him a detailed handout on that medication he will read about it and will discuss it again at follow-up. Diet :   30-40 grams per meal , 3 meals per day, avoid carbs in snack choices  Include moderate proteins and good fats   Ensure adequate hydration and  electrolyte replacement    Exercise :  Previously following recommendations were made exercise is 5-7 days a week for 30-60 mins at least, per day OR a total of 2.5 hours per week , which ever is more feasible. Ambulate as possible     Diabetic Health Maintenance  Follow up with annual eye exams last feb 2022 retinopathy s/p laser left eye years ago   Follow up with annual podiatry exams if needed  Reviewed sick day management of BG     Other areas of Diabetic Education reviewed:   Carbs: good carbs and bad carbs, importance of carb counting, incorporation of protein with each meal to reduce Glycemic index, importance of portions, Carb/insulin ratio   Fats: Good fats and bad fats, meal planning and supplements.  Discussed how food affects blood sugar readings.  Different diabetic medications   Managing high and low sugar readings   Rotation of sites for subcutaneous medication injection    Mixed Hyperlipidemia  LDL goal  < 100;   TG not elevated at 84  Continue current regimen    Essential Hypertension  Blood pressure controlled.  Continue current regimen    Vitamin D deficiency  Recheck levels  Supplement 1000 IU QD    SUBCLINICAL HYPOTHYROIDISM   Check thyroid function test with thyroid antibodies last TSH was elevated at 4.7 in September 2021      I spent  40 + minutes which includes reviewing patient chart , interpreting previous lab results  , discussing and providing counseling and coordinating care of patient's multiple health issues with  the patient. Patient new to me previously followed with Raymundo Morris and Long Persaud     Return in about 4 months (around 7/16/2022).

## 2022-03-21 ENCOUNTER — TELEPHONE (OUTPATIENT)
Dept: ENDOCRINOLOGY | Age: 72
End: 2022-03-21

## 2022-03-21 DIAGNOSIS — E03.8 SUBCLINICAL HYPOTHYROIDISM: ICD-10-CM

## 2022-03-21 DIAGNOSIS — Z79.4 TYPE 2 DIABETES MELLITUS WITH DIABETIC POLYNEUROPATHY, WITH LONG-TERM CURRENT USE OF INSULIN (HCC): ICD-10-CM

## 2022-03-21 DIAGNOSIS — E11.42 TYPE 2 DIABETES MELLITUS WITH DIABETIC POLYNEUROPATHY, WITH LONG-TERM CURRENT USE OF INSULIN (HCC): ICD-10-CM

## 2022-03-21 NOTE — TELEPHONE ENCOUNTER
Patient called stating Dr Nikki Morales was going to call in [address] [place] [address] at his office visit last week. I don't see any mention of this in his plan of care. No prescription was sent. Please advise.

## 2022-03-21 NOTE — TELEPHONE ENCOUNTER
Dr. Elidia Hunter last office visit note stated she wanted patient to read about the Marrie  and will discuss starting it at his follow up appointment. Patient made aware and no questions or concerns at this time.

## 2022-03-22 LAB
A/G RATIO: 2.4 (ref 1.1–2.2)
ALBUMIN SERPL-MCNC: 4.7 G/DL (ref 3.4–5)
ALP BLD-CCNC: 93 U/L (ref 40–129)
ALT SERPL-CCNC: 26 U/L (ref 10–40)
ANION GAP SERPL CALCULATED.3IONS-SCNC: 14 MMOL/L (ref 3–16)
ANTI-THYROGLOB ABS: 13 IU/ML
AST SERPL-CCNC: 18 U/L (ref 15–37)
BILIRUB SERPL-MCNC: 1.2 MG/DL (ref 0–1)
BUN BLDV-MCNC: 20 MG/DL (ref 7–20)
CALCIUM SERPL-MCNC: 9.1 MG/DL (ref 8.3–10.6)
CHLORIDE BLD-SCNC: 102 MMOL/L (ref 99–110)
CHOLESTEROL, TOTAL: 156 MG/DL (ref 0–199)
CO2: 26 MMOL/L (ref 21–32)
CREAT SERPL-MCNC: 1 MG/DL (ref 0.8–1.3)
CREATININE URINE: 163.4 MG/DL (ref 39–259)
GFR AFRICAN AMERICAN: >60
GFR NON-AFRICAN AMERICAN: >60
GLUCOSE BLD-MCNC: 132 MG/DL (ref 70–99)
HDLC SERPL-MCNC: 36 MG/DL (ref 40–60)
LDL CHOLESTEROL CALCULATED: 97 MG/DL
MICROALBUMIN UR-MCNC: <1.2 MG/DL
MICROALBUMIN/CREAT UR-RTO: NORMAL MG/G (ref 0–30)
POTASSIUM SERPL-SCNC: 4.7 MMOL/L (ref 3.5–5.1)
SODIUM BLD-SCNC: 142 MMOL/L (ref 136–145)
T3 TOTAL: 1.01 NG/ML (ref 0.8–2)
T4 FREE: 1.5 NG/DL (ref 0.9–1.8)
THYROID PEROXIDASE (TPO) ABS: 9 IU/ML
TOTAL PROTEIN: 6.7 G/DL (ref 6.4–8.2)
TRIGL SERPL-MCNC: 116 MG/DL (ref 0–150)
TSH SERPL DL<=0.05 MIU/L-ACNC: 8.13 UIU/ML (ref 0.27–4.2)
VITAMIN D 25-HYDROXY: 33.3 NG/ML
VLDLC SERPL CALC-MCNC: 23 MG/DL

## 2022-04-05 ENCOUNTER — OFFICE VISIT (OUTPATIENT)
Dept: INTERNAL MEDICINE CLINIC | Age: 72
End: 2022-04-05
Payer: COMMERCIAL

## 2022-04-05 VITALS
DIASTOLIC BLOOD PRESSURE: 68 MMHG | BODY MASS INDEX: 31.83 KG/M2 | HEIGHT: 74 IN | SYSTOLIC BLOOD PRESSURE: 132 MMHG | WEIGHT: 248 LBS

## 2022-04-05 DIAGNOSIS — E03.8 SUBCLINICAL HYPOTHYROIDISM: ICD-10-CM

## 2022-04-05 DIAGNOSIS — Z00.00 ROUTINE GENERAL MEDICAL EXAMINATION AT A HEALTH CARE FACILITY: Primary | ICD-10-CM

## 2022-04-05 DIAGNOSIS — Z79.4 TYPE 2 DIABETES MELLITUS WITH DIABETIC POLYNEUROPATHY, WITH LONG-TERM CURRENT USE OF INSULIN (HCC): ICD-10-CM

## 2022-04-05 DIAGNOSIS — E11.42 TYPE 2 DIABETES MELLITUS WITH DIABETIC POLYNEUROPATHY, WITH LONG-TERM CURRENT USE OF INSULIN (HCC): ICD-10-CM

## 2022-04-05 DIAGNOSIS — E78.5 HYPERLIPIDEMIA, UNSPECIFIED HYPERLIPIDEMIA TYPE: ICD-10-CM

## 2022-04-05 PROCEDURE — 99397 PER PM REEVAL EST PAT 65+ YR: CPT | Performed by: INTERNAL MEDICINE

## 2022-04-05 SDOH — ECONOMIC STABILITY: FOOD INSECURITY: WITHIN THE PAST 12 MONTHS, THE FOOD YOU BOUGHT JUST DIDN'T LAST AND YOU DIDN'T HAVE MONEY TO GET MORE.: NEVER TRUE

## 2022-04-05 SDOH — ECONOMIC STABILITY: FOOD INSECURITY: WITHIN THE PAST 12 MONTHS, YOU WORRIED THAT YOUR FOOD WOULD RUN OUT BEFORE YOU GOT MONEY TO BUY MORE.: NEVER TRUE

## 2022-04-05 ASSESSMENT — ENCOUNTER SYMPTOMS
ABDOMINAL PAIN: 0
CONSTIPATION: 0
DIARRHEA: 0
SHORTNESS OF BREATH: 0
COUGH: 0
SINUS PAIN: 0
BACK PAIN: 0

## 2022-04-05 ASSESSMENT — PATIENT HEALTH QUESTIONNAIRE - PHQ9
SUM OF ALL RESPONSES TO PHQ QUESTIONS 1-9: 0
SUM OF ALL RESPONSES TO PHQ9 QUESTIONS 1 & 2: 0
SUM OF ALL RESPONSES TO PHQ QUESTIONS 1-9: 0
SUM OF ALL RESPONSES TO PHQ QUESTIONS 1-9: 0
2. FEELING DOWN, DEPRESSED OR HOPELESS: 0
SUM OF ALL RESPONSES TO PHQ QUESTIONS 1-9: 0
1. LITTLE INTEREST OR PLEASURE IN DOING THINGS: 0

## 2022-04-05 ASSESSMENT — SOCIAL DETERMINANTS OF HEALTH (SDOH): HOW HARD IS IT FOR YOU TO PAY FOR THE VERY BASICS LIKE FOOD, HOUSING, MEDICAL CARE, AND HEATING?: NOT HARD AT ALL

## 2022-04-05 NOTE — PROGRESS NOTES
2022    Desmond Kerns (:  1950) kelsie 80 y.o. male, here for a preventive medicine evaluation. Patient Active Problem List   Diagnosis    DKA (diabetic ketoacidoses)    Paroxysmal atrial fibrillation (HCC)    History of pulmonary embolus (PE)    DVT (deep venous thrombosis) (HCC)    Diabetes mellitus ([facility] [address]    History of thrombosis    Mild nonproliferative diabetic retinopathy of both eyes without macular edema associated with type 2 diabetes mellitus (HCC)    Hyperlipidemia    Subclinical hypothyroidism     Only needing gabapentin just a couple of times per day    Diclofenac working for hip pain - saw ortho, now able to walk multiple kilometers without significant pain. Was recently traveling in [facility]. Eye exam done in Feb    Review of Systems   Constitutional: Negative for fatigue and unexpected weight change. HENT: Negative for ear pain and sinus pain. Eyes: Negative for visual disturbance. Respiratory: Negative for cough and shortness of breath. Cardiovascular: Negative for chest pain and leg swelling. Gastrointestinal: Negative for abdominal pain, constipation and diarrhea. Endocrine: Negative for polydipsia and polyuria. Genitourinary: Negative for difficulty urinating and frequency. Musculoskeletal: Negative for arthralgias and back pain. Skin: Negative for rash. Neurological: Negative for weakness and numbness. Psychiatric/Behavioral: Negative for dysphoric mood and sleep disturbance. The patient is not nervous/anxious. Prior to Visit Medications    Medication Sig Taking?  Authorizing Provider   diclofenac (VOLTAREN) 50 MG EC tablet TAKE ONE TABLET BY MOUTH TWICE A DAY Yes Historical Provider, MD   Insulin Pen Needle (BD PEN NEEDLE CHELSEA 2ND GEN) 32G X 4 MM MISC Inject 1 each into the skin 6 times daily Yes MARIPOSA Chamorro - CNP   insulin lispro, 1 Unit Dial, (HUMALOG KWIKPEN) 100 UNIT/ML SOPN INJECT 10 TO 18 UNITS SUBCUTANEOUSLY 3 TIMES A  DAY BEFORE MEALS Yes Becky Bowels APRN - CNP   gabapentin (NEURONTIN) 600 MG tablet TAKE ONE TABLET BY MOUTH FOUR TIMES A DAY  Patient taking differently: as needed. TAKE ONE TABLET BY MOUTH FOUR TIMES A DAY Yes Becky Bowels, APRN - CNP   ONETOUCH ULTRA strip USE AND DISCARD 1 TEST     STRIP 3 TIMES A DAY.  Yes Marquez Easley MD   simvastatin (ZOCOR) 40 MG tablet TAKE ONE TABLET BY MOUTH EVERY EVENING Yes Placido Payton MD   Liraglutide (VICTOZA) 18 MG/3ML SOPN SC injection Inject 1.8 mg into the skin daily Yes eBcky Bowels APRN - CNP   BASAGLAR KWIKPEN 100 UNIT/ML injection pen Inject 30 Units into the skin 2 times daily  Patient taking differently: Inject 55 Units into the skin nightly  Yes Becky Bowels APRN - CNP   Multiple Vitamins-Minerals (MULTIVITAMIN ADULT PO) Take by mouth Yes Historical Provider, MD Dionna Britt MISC Dx Code E 10.9 Yes Melodie Gamboa MD   aspirin 81 MG tablet Take 81 mg by mouth daily Yes Historical Provider, MD        Allergies   Allergen Reactions    Morphine Other (See Comments)     Skin turns gray and feel like he's covered in ants    Morphine And Related Other (See Comments)     Feels like bugs are crawling all over it and skin gets grayish color       Past Medical History:   Diagnosis Date    Chronic kidney disease     Diabetic eye exam ([facility] [address] 10/06/2017    Dr Love [place] Hyperlipidemia 3/13/2018    Mild nonproliferative diabetic retinopathy of both eyes without macular edema associated with type 2 diabetes mellitus ([address] 10/10/2017    Personal history of DVT (deep vein thrombosis)     Pulmonary emboli (HCC)     when hospitalized for DKA    Subclinical hypothyroidism 10/29/2018    Type 2 diabetes mellitus without complication ([address]     Type II or unspecified type diabetes mellitus without mention of complication, not stated as uncontrolled        Past Surgical History:   Procedure Laterality Date    APPENDECTOMY      COLONOSCOPY  05/20/2010     [place]    CYST REMOVAL      HERNIA REPAIR      SHOULDER SURGERY      TURP  02/2016       Social History     Socioeconomic History    Marital status:      Spouse name: Not on file    Number of children: Not on file    Years of education: Not on file    Highest education level: Not on file   Occupational History    Not on file   Tobacco Use    Smoking status: Never Smoker    Smokeless tobacco: Never Used   Vaping Use    Vaping Use: Never used   Substance and Sexual Activity    Alcohol use: Yes     Comment: social - maybe 1/day    Drug use: No    Sexual activity: Not on file   Other Topics Concern    Not on file   Social History Narrative    Not on file     Social Determinants of Health     Financial Resource Strain: Low Risk     Difficulty of Paying Living Expenses: Not hard at all   Food Insecurity: No Food Insecurity    Worried About Running Out of Food in the Last Year: Never true    Fatuma of Food in the Last Year: Never true   Transportation Needs:     Lack of Transportation (Medical): Not on file    Lack of Transportation (Non-Medical):  Not on file   Physical Activity:     Days of Exercise per Week: Not on file    Minutes of Exercise per Session: Not on file   Stress:     Feeling of Stress : Not on file   Social Connections:     Frequency of Communication with Friends and Family: Not on file    Frequency of Social Gatherings with Friends and Family: Not on file    Attends Adventism Services: Not on file    Active Member of Clubs or Organizations: Not on file    Attends Club or Organization Meetings: Not on file    Marital Status: Not on file   Intimate Partner Violence:     Fear of Current or Ex-Partner: Not on file    Emotionally Abused: Not on file    Physically Abused: Not on file    Sexually Abused: Not on file   Housing Stability:     Unable to Pay for Housing in the Last Year: Not on file    Number of Places Lived in the Last Year: Not on file    Unstable Housing in the Last Year: Not on file        Family History   Problem Relation Age of Onset    Cancer Mother         uterine (chemo/radiation done), leukemia 2006    Heart Surgery Father         valve replacement    Diabetes Brother         type 2       ADVANCEDIRECTIVE: N, <no information>    Vitals:    04/05/22 1101   BP: 132/68   Site: Left Upper Arm   Weight: 248 lb (112.5 kg)   Height: 6' 2\" (1.88 m)     Estimated body mass index is 31.84 kg/m² as calculated from the following:    Height as of this encounter: 6' 2\" (1.88 m). Weight as of this encounter: 248 lb (112.5 kg). Physical Exam  Vitals reviewed. Constitutional:       General: He is not in acute distress. Appearance: Normal appearance. He is well-developed. He is obese. HENT:      Head: Normocephalic and atraumatic. Right Ear: Tympanic membrane, ear canal and external ear normal.      Left Ear: Tympanic membrane, ear canal and external ear normal.   Eyes:      General: No scleral icterus. Conjunctiva/sclera: Conjunctivae normal.   Neck:      Thyroid: No thyroid mass, thyromegaly or thyroid tenderness. Vascular: No carotid bruit. Cardiovascular:      Rate and Rhythm: Normal rate and regular rhythm. Heart sounds: Normal heart sounds. Pulmonary:      Effort: Pulmonary effort is normal. No respiratory distress. Breath sounds: Normal breath sounds. Abdominal:      General: Abdomen is flat. Bowel sounds are normal. There is no distension. Palpations: Abdomen is soft. There is no mass. Tenderness: There is no abdominal tenderness. Musculoskeletal:      Cervical back: Neck supple. Right lower leg: No edema. Left lower leg: No edema. Lymphadenopathy:      Cervical: No cervical adenopathy. Skin:     General: Skin is warm and dry. Neurological:      General: No focal deficit present. Mental Status: He is alert and oriented to person, place, and time.    Psychiatric:         Mood and Affect: Mood normal.         Behavior: Behavior normal.         Thought Content: Thought content normal.         Judgment: Judgment normal.         No flowsheet data found.     Lab Results   Component Value Date    CHOL 156 03/21/2022    CHOL 123 09/17/2021    CHOL 111 11/18/2020    TRIG 116 03/21/2022    TRIG 70 09/17/2021    TRIG 84 11/18/2020    HDL 36 03/21/2022    HDL 35 09/17/2021    HDL 32 11/18/2020    HDL 38 11/01/2011    HDL 32 06/10/2010    LDLCALC 97 03/21/2022    LDLCALC 74 09/17/2021    LDLCALC 62 11/18/2020    GLUCOSE 132 03/21/2022    LABA1C 7.8 03/16/2022    LABA1C 7.7 09/17/2021    LABA1C 7.5 03/23/2021       The 10-year ASCVD risk score (Celsa Barger, et al., 2013) is: 34%    Values used to calculate the score:      Age: 80 years      Sex: Male      Is Non- : No      Diabetic: Yes      Tobacco smoker: No      Systolic Blood Pressure: 250 mmHg      Is BP treated: No      HDL Cholesterol: 36 mg/dL      Total Cholesterol: 156 mg/dL    Immunization History   Administered Date(s) Administered    COVID-19, Pfizer Purple top, DILUTE for use, 12+ yrs, 30mcg/0.3mL dose 03/11/2021, 04/08/2021, 11/20/2021    Pneumococcal Conjugate 13-valent ([id number]) 02/01/2017    Pneumococcal Polysaccharide ([id number]) 03/13/2018, 10/14/2019    Tdap (Boostrix, Adacel) 08/31/2020    Zoster Recombinant (Shingrix) 11/14/2019       Health Maintenance   Topic Date Due    Diabetic foot exam  07/13/2021    Diabetic retinal exam  01/08/2022    PSA counseling  04/05/2023 (Originally 1/22/2017)    Flu vaccine (Season Ended) 09/08/2025 (Originally 9/1/2022)    A1C test (Diabetic or Prediabetic)  03/16/2023    Diabetic microalbuminuria test  03/21/2023    Lipid screen  03/21/2023    TSH testing  03/21/2023    Depression Screen  04/05/2023    Colorectal Cancer Screen  05/25/2024    DTaP/Tdap/Td vaccine (2 - Td or Tdap) 08/31/2030    Shingles Vaccine  Completed    Pneumococcal 65+ years Vaccine Completed    COVID-19 Vaccine  Completed    Hepatitis C screen  Completed    Hepatitis A vaccine  Aged Out    Hib vaccine  Aged Out    Meningococcal (ACWY) vaccine  Aged Out       ASSESSMENT/PLAN:  1. Routine general medical examination at a health care facility  - Discussed age appropriate preventive care including healthy diet, daily exercise, immunizations and age & gender guided screening tests. 2. Hyperlipidemia, unspecified hyperlipidemia type  -Controlled, continue simvastatin 40 mg daily    3. Subclinical hypothyroidism  -TSH will be reassessed in the summer    4. Type 2 diabetes mellitus with diabetic polyneuropathy, with long-term current use of insulin ([facility] [address]  -Sees an endocrinologist, currently managed with Jocelyne Del Angel, and Humalog      Return in about 1 year (around 4/5/2023) for CPE.

## 2022-04-13 DIAGNOSIS — E11.42 TYPE 2 DIABETES MELLITUS WITH DIABETIC POLYNEUROPATHY, WITH LONG-TERM CURRENT USE OF INSULIN (HCC): ICD-10-CM

## 2022-04-13 DIAGNOSIS — Z79.4 TYPE 2 DIABETES MELLITUS WITH DIABETIC POLYNEUROPATHY, WITH LONG-TERM CURRENT USE OF INSULIN (HCC): ICD-10-CM

## 2022-04-13 RX ORDER — GABAPENTIN 600 MG/1
TABLET ORAL
Qty: 120 TABLET | Refills: 2 | Status: SHIPPED | OUTPATIENT
Start: 2022-04-13 | End: 2022-10-13

## 2022-04-13 NOTE — TELEPHONE ENCOUNTER
Fax from [address] [place] [address] request for Gabapentin 600mg tabs    LOV    3-16-22  FOV    8-16-22

## 2022-04-14 ENCOUNTER — PATIENT MESSAGE (OUTPATIENT)
Dept: ENDOCRINOLOGY | Age: 72
End: 2022-04-14

## 2022-04-14 DIAGNOSIS — Z79.4 TYPE 2 DIABETES MELLITUS WITH DIABETIC POLYNEUROPATHY, WITH LONG-TERM CURRENT USE OF INSULIN (HCC): ICD-10-CM

## 2022-04-14 DIAGNOSIS — E11.42 TYPE 2 DIABETES MELLITUS WITH DIABETIC POLYNEUROPATHY, WITH LONG-TERM CURRENT USE OF INSULIN (HCC): ICD-10-CM

## 2022-04-14 RX ORDER — INSULIN GLARGINE 100 [IU]/ML
30 INJECTION, SOLUTION SUBCUTANEOUS 2 TIMES DAILY
Qty: 20 PEN | Refills: 1 | Status: SHIPPED | OUTPATIENT
Start: 2022-04-14 | End: 2022-04-19 | Stop reason: SDUPTHER

## 2022-04-14 NOTE — TELEPHONE ENCOUNTER
From: Ivy Eaton  To: Dr. Hortensia Acosta  Sent: 2022 8:08 AM EDT  Subject: prescription    I am almost out of Ben Courtney. I tried to order it through [address] [place] [address] but my prescription has . Could you please contact them and renew my prescription as I am down to just two pens.   Thank you

## 2022-04-19 ENCOUNTER — TELEPHONE (OUTPATIENT)
Dept: ENDOCRINOLOGY | Age: 72
End: 2022-04-19

## 2022-04-19 DIAGNOSIS — Z79.4 TYPE 2 DIABETES MELLITUS WITH DIABETIC POLYNEUROPATHY, WITH LONG-TERM CURRENT USE OF INSULIN (HCC): ICD-10-CM

## 2022-04-19 DIAGNOSIS — E11.42 TYPE 2 DIABETES MELLITUS WITH DIABETIC POLYNEUROPATHY, WITH LONG-TERM CURRENT USE OF INSULIN (HCC): ICD-10-CM

## 2022-04-19 RX ORDER — INSULIN GLARGINE 100 [IU]/ML
INJECTION, SOLUTION SUBCUTANEOUS
Qty: 20 PEN | Refills: 1 | Status: SHIPPED | OUTPATIENT
Start: 2022-04-19 | End: 2022-10-14 | Stop reason: DRUGHIGH

## 2022-04-22 NOTE — TELEPHONE ENCOUNTER
Fax from [facility] required for [employer]
Form faxed.
[facility]anae calling for clarification on Basaglar. Sig says both 30 units twice a day and 60 units daily. Reviewed last office visit but that states 50 units in the AM. Please clarify dose and call pharmacy at [phone] option 2 and use ref # [id number].
28 yo M with epigastric abd pain, consistent with chronic gastritis/hyperemesis syndrome   -basic labs, lactate, lipase, coags, ekg, iv, NS hydration, pepcid, reglan, maalox plus lido, haldol IV 2.5 mg (effective in past), potassium replacement prn  -f/u results, reeval

## 2022-05-13 RX ORDER — SIMVASTATIN 40 MG
TABLET ORAL
Qty: 90 TABLET | Refills: 3 | Status: SHIPPED | OUTPATIENT
Start: 2022-05-13

## 2022-08-12 ENCOUNTER — TELEPHONE (OUTPATIENT)
Dept: ENDOCRINOLOGY | Age: 72
End: 2022-08-12

## 2022-08-12 DIAGNOSIS — E03.8 SUBCLINICAL HYPOTHYROIDISM: ICD-10-CM

## 2022-08-12 DIAGNOSIS — E55.9 VITAMIN D DEFICIENCY: ICD-10-CM

## 2022-08-12 DIAGNOSIS — Z79.4 TYPE 2 DIABETES MELLITUS WITH DIABETIC POLYNEUROPATHY, WITH LONG-TERM CURRENT USE OF INSULIN (HCC): Primary | ICD-10-CM

## 2022-08-12 DIAGNOSIS — E11.42 TYPE 2 DIABETES MELLITUS WITH DIABETIC POLYNEUROPATHY, WITH LONG-TERM CURRENT USE OF INSULIN (HCC): Primary | ICD-10-CM

## 2022-08-16 ENCOUNTER — OFFICE VISIT (OUTPATIENT)
Dept: ENDOCRINOLOGY | Age: 72
End: 2022-08-16
Payer: COMMERCIAL

## 2022-08-16 ENCOUNTER — TELEPHONE (OUTPATIENT)
Dept: ENDOCRINOLOGY | Age: 72
End: 2022-08-16

## 2022-08-16 VITALS
HEART RATE: 74 BPM | WEIGHT: 244 LBS | DIASTOLIC BLOOD PRESSURE: 72 MMHG | RESPIRATION RATE: 16 BRPM | HEIGHT: 74 IN | BODY MASS INDEX: 31.32 KG/M2 | SYSTOLIC BLOOD PRESSURE: 141 MMHG

## 2022-08-16 DIAGNOSIS — Z79.4 TYPE 2 DIABETES MELLITUS WITH DIABETIC POLYNEUROPATHY, WITH LONG-TERM CURRENT USE OF INSULIN (HCC): Primary | ICD-10-CM

## 2022-08-16 DIAGNOSIS — E11.42 TYPE 2 DIABETES MELLITUS WITH DIABETIC POLYNEUROPATHY, WITH LONG-TERM CURRENT USE OF INSULIN (HCC): Primary | ICD-10-CM

## 2022-08-16 DIAGNOSIS — I10 ESSENTIAL HYPERTENSION: ICD-10-CM

## 2022-08-16 DIAGNOSIS — E78.5 HYPERLIPIDEMIA, UNSPECIFIED HYPERLIPIDEMIA TYPE: ICD-10-CM

## 2022-08-16 DIAGNOSIS — E03.8 SUBCLINICAL HYPOTHYROIDISM: ICD-10-CM

## 2022-08-16 LAB — HBA1C MFR BLD: 7.6 %

## 2022-08-16 PROCEDURE — 99214 OFFICE O/P EST MOD 30 MIN: CPT | Performed by: INTERNAL MEDICINE

## 2022-08-16 PROCEDURE — 3051F HG A1C>EQUAL 7.0%<8.0%: CPT | Performed by: INTERNAL MEDICINE

## 2022-08-16 PROCEDURE — 1123F ACP DISCUSS/DSCN MKR DOCD: CPT | Performed by: INTERNAL MEDICINE

## 2022-08-16 PROCEDURE — 83036 HEMOGLOBIN GLYCOSYLATED A1C: CPT | Performed by: INTERNAL MEDICINE

## 2022-08-16 NOTE — PROGRESS NOTES
Brigida Boucher is a 71 y.o. male seen  for management of Diabetes Mellitus Type 2. Patient has a PMH of Type 2 DM, hypertension, hyperlipidemia, DKA, BPH, PE, DVT     Diagnosed with Diabetes Mellitus  in 2004,    In 2016, he was having polyuria, polydypsia, dry mouth. Went to ER at [facility] and was found to have UTI and DKA. BS were >800. Oral meds were stopped and he was started in insulin. Course has been variable . Microvascular complications:   Estb with CEI and is + retinopathy s/p laser  (Last eye exam: 10/17, 10/18, 06/20 ), Peripheral neuropathy. No nephropathy . Home regimen:    Injects 4 times per day  basaglar 50 units  Humalog on carb ratio of 1:5 + Medium dose SSI   victoza  1.2 mg daily      Previous medications: metformin, glimipiride. Subclinical hypothyroidism :   TSH has been high normal.  no other symptoms suggestive of  hypothyroidism  No FH of thyroid disease. Weight is stable. Hyperlipidemia: Currently is on Simvastatin 40 mg daily, denies myalgias and  otherwise tolerates well. Vitamin D deficiency: Currently is on no supplementation. Current complaints include fatigue on daily basis. Hypertension  Controlled , denies symptoms of dizziness, light headedness. Occasional dependent edema. Tries to follow a salt restricted diet.      INTERIM   He denies any unexplained hypoglycemia         Tobacco/Alcohol History:   Tobacco Use    Smoking status: Never Smoker    Smokeless tobacco: Never Used   Substance and Sexual Activity    Alcohol use: Yes     Comment: social - maybe 1/day       PMH includes  Past Medical History:   Diagnosis Date    Chronic kidney disease     Diabetic eye exam ([facility] [address] 10/06/2017    Dr Shayan Lazcano    Hyperlipidemia 3/13/2018    Mild nonproliferative diabetic retinopathy of both eyes without macular edema associated with type 2 diabetes mellitus ([address] 10/10/2017    Personal history of DVT (deep vein thrombosis)     Pulmonary emboli ([address]     when hospitalized for DKA    Subclinical hypothyroidism 10/29/2018    Type 2 diabetes mellitus without complication (HCC)     Type II or unspecified type diabetes mellitus without mention of complication, not stated as uncontrolled      Family History   Problem Relation Age of Onset    Cancer Mother         uterine (chemo/radiation done), leukemia 2006    Heart Surgery Father         valve replacement    Diabetes Brother         type 2           Vitals:    08/16/22 0936   BP: (!) 141/72   Pulse: 74   Resp: 16   Weight: 244 lb (110.7 kg)   Height: 6' 2\" (1.88 m)     ROS  I have reviewed the review of system questionnaire filled by the patient .   Patient was advised to contact PCP for non endocrine signs and symptoms       EXAM   Constitutional: no acute distress, well appearing, well nourished  Psychiatric: oriented to person, place and time, judgement, insight and normal, recent and remote memory and intact and mood, affect are normal  Skin: skin and subcutaneous tissue is normal without mass,   Head and Face: examination of head and face revealed no abnormalities  Eyes: no lid or conjunctival swelling, no erythema or discharge, pupils are normal,   Ears/Nose: external inspection of ears and nose revealed no abnormalities, hearing is grossly normal  Oropharynx/Mouth/Face: lips, tongue and gums are normal with no lesions, the voice quality was normal  Neck: neck is supple and symmetric, with midline trachea and no masses, thyroid is normal    Pulmonary: no increased work of breathing or signs of respiratory distress, lungs are clear to auscultation  Cardiovascular: normal heart rate and rhythm, normal S1 and S2,   Musculoskeletal: normal gait and station,   Neurological: normal coordination, normal general cortical function      Assessment  Rob Ospina is a 71 y.o. male with uncontrolled Diabetes Mellitus type 2 complicated by retinopathy and peripheral neuropathy and associated with HTN, HLD, vitamin  D deficiency, subclinical hypothyroidism    Plan   --unocntrolled Diabetes Mellitus Type 2 after his DKA in 2016 he had one low C-peptide reading but his repeat C-peptide reading was 4.6 in 2019, with negative janes antibodies    Victoza  Basaglar 50 units daily   Humalog 12--15 units with each meal .  We will switch to once weekly Ozempic. Start on Jardiance 10 mg daily. Patient was advised not to stop his insulin even if his fingerstick glucose improve without letting us know due to the incidence of DKA associated with SGLT2 inhibitors. All other side effect discussed with the patient and he was given a written handout on side effects. Diet :   30-40 grams per meal , 3 meals per day, avoid carbs in snack choices  Include moderate proteins and good fats   Ensure adequate hydration and  electrolyte replacement    Exercise :  Previously following recommendations were made exercise is 5-7 days a week for 30-60 mins at least, per day OR a total of 2.5 hours per week , which ever is more feasible. Ambulate as possible     Diabetic Health Maintenance  Follow up with annual eye exams last feb 2022 retinopathy s/p laser left eye years ago   Follow up with annual podiatry exams if needed  Reviewed sick day management of BG     Other areas of Diabetic Education reviewed:  Carbs: good carbs and bad carbs, importance of carb counting, incorporation of protein with each meal to reduce Glycemic index, importance of portions, Carb/insulin ratio  Fats: Good fats and bad fats, meal planning and supplements. Discussed how food affects blood sugar readings. Different diabetic medications  Managing high and low sugar readings  Rotation of sites for subcutaneous medication injection    Mixed Hyperlipidemia  LDL goal  < 100;   TG not elevated at 84  Continue current regimen    Essential Hypertension  Blood pressure controlled.  Continue current regimen    Vitamin D deficiency  Recheck levels  Supplement 1000 IU QD    SUBCLINICAL HYPOTHYROIDISM

## 2022-08-16 NOTE — TELEPHONE ENCOUNTER
Evaline [place]: [id number] - PA Case ID: [id number] - Rx #: [id number]  Outcome  Approved today  Your PA request has been approved.

## 2022-08-16 NOTE — TELEPHONE ENCOUNTER
Fax from [facility]/covermymeds    Jardiance 10mg tabs has been rejected , exceeds the ins.  Plans limits on dispensed qt and or day supply

## 2022-09-23 ENCOUNTER — TELEPHONE (OUTPATIENT)
Dept: INTERNAL MEDICINE CLINIC | Age: 72
End: 2022-09-23

## 2022-09-23 NOTE — TELEPHONE ENCOUNTER
----- Message from Cameron Moore sent at 4/54/2650  9:21 AM EDT -----  Subject: Appointment Request    Reason for Call: Established Patient Appointment needed: Routine Physical   Exam    QUESTIONS    Reason for appointment request? Available appointments did not meet   patient need     Additional Information for Provider? Pt will be having cataract surgery on   11/2/2022 and required a physical prior, however the 1st available appt is   showing 11/2/2022 and pt is needing at least 30 days before.  please reach   to pt to schedule  ---------------------------------------------------------------------------  --------------  [facility] INFO  [phone]; OK to leave message on voicemail  ---------------------------------------------------------------------------  --------------  SCRIPT ANSWERS  JOSR Screen: Elise Uribe

## 2022-10-10 ENCOUNTER — PATIENT MESSAGE (OUTPATIENT)
Dept: ENDOCRINOLOGY | Age: 72
End: 2022-10-10

## 2022-10-10 DIAGNOSIS — E10.42 TYPE 1 DIABETES MELLITUS WITH DIABETIC POLYNEUROPATHY (HCC): ICD-10-CM

## 2022-10-10 RX ORDER — BLOOD SUGAR DIAGNOSTIC
STRIP MISCELLANEOUS
Qty: 300 STRIP | Refills: 5 | Status: SHIPPED | OUTPATIENT
Start: 2022-10-10

## 2022-10-10 NOTE — TELEPHONE ENCOUNTER
From: Shanon Mtz  To: Dr. Marlyn Wyatt  Sent: 10/10/2022 7:58 AM EDT  Subject: test strip refill    Dr. Becca Kim,  Could you please approve a new prescription for One Touch Ultra test strips from [employer]? Thank you.     Mila Dsouza  [phone]

## 2022-10-13 DIAGNOSIS — E11.42 TYPE 2 DIABETES MELLITUS WITH DIABETIC POLYNEUROPATHY, WITH LONG-TERM CURRENT USE OF INSULIN (HCC): ICD-10-CM

## 2022-10-13 DIAGNOSIS — Z79.4 TYPE 2 DIABETES MELLITUS WITH DIABETIC POLYNEUROPATHY, WITH LONG-TERM CURRENT USE OF INSULIN (HCC): ICD-10-CM

## 2022-10-13 RX ORDER — GABAPENTIN 600 MG/1
TABLET ORAL
Qty: 120 TABLET | Refills: 2 | Status: SHIPPED | OUTPATIENT
Start: 2022-10-13 | End: 2023-01-13

## 2022-10-14 ENCOUNTER — OFFICE VISIT (OUTPATIENT)
Dept: INTERNAL MEDICINE CLINIC | Age: 72
End: 2022-10-14
Payer: COMMERCIAL

## 2022-10-14 VITALS
TEMPERATURE: 97.1 F | HEIGHT: 74 IN | BODY MASS INDEX: 30.39 KG/M2 | WEIGHT: 236.8 LBS | SYSTOLIC BLOOD PRESSURE: 130 MMHG | OXYGEN SATURATION: 97 % | DIASTOLIC BLOOD PRESSURE: 64 MMHG | HEART RATE: 74 BPM

## 2022-10-14 DIAGNOSIS — I48.0 PAROXYSMAL ATRIAL FIBRILLATION (HCC): ICD-10-CM

## 2022-10-14 DIAGNOSIS — H26.9 CATARACT OF LEFT EYE, UNSPECIFIED CATARACT TYPE: ICD-10-CM

## 2022-10-14 DIAGNOSIS — E11.42 TYPE 2 DIABETES MELLITUS WITH DIABETIC POLYNEUROPATHY, WITH LONG-TERM CURRENT USE OF INSULIN (HCC): ICD-10-CM

## 2022-10-14 DIAGNOSIS — Z79.4 TYPE 2 DIABETES MELLITUS WITH DIABETIC POLYNEUROPATHY, WITH LONG-TERM CURRENT USE OF INSULIN (HCC): ICD-10-CM

## 2022-10-14 DIAGNOSIS — Z01.818 PREOP EXAMINATION: Primary | ICD-10-CM

## 2022-10-14 PROCEDURE — 99213 OFFICE O/P EST LOW 20 MIN: CPT | Performed by: INTERNAL MEDICINE

## 2022-10-14 PROCEDURE — 1123F ACP DISCUSS/DSCN MKR DOCD: CPT | Performed by: INTERNAL MEDICINE

## 2022-10-14 PROCEDURE — 3051F HG A1C>EQUAL 7.0%<8.0%: CPT | Performed by: INTERNAL MEDICINE

## 2022-10-14 RX ORDER — INSULIN GLARGINE 100 [IU]/ML
50 INJECTION, SOLUTION SUBCUTANEOUS NIGHTLY
COMMUNITY

## 2022-10-14 ASSESSMENT — PATIENT HEALTH QUESTIONNAIRE - PHQ9
2. FEELING DOWN, DEPRESSED OR HOPELESS: 0
SUM OF ALL RESPONSES TO PHQ9 QUESTIONS 1 & 2: 0
1. LITTLE INTEREST OR PLEASURE IN DOING THINGS: 0
SUM OF ALL RESPONSES TO PHQ QUESTIONS 1-9: 0

## 2022-10-14 NOTE — PROGRESS NOTES
Preoperative Consultation      Ilia Santos  YOB: 1950    Date of Service:  10/14/2022    Vitals:    10/14/22 0801   BP: 130/64   Site: Left Upper Arm   Position: Sitting   Cuff Size: Large Adult   Pulse: 74   Temp: 97.1 °F (36.2 °C)   TempSrc: Temporal   SpO2: 97%   Weight: 236 lb 12.8 oz (107.4 kg)   Height: 6' 2\" (1.88 m)      Wt Readings from Last 2 Encounters:   10/14/22 236 lb 12.8 oz (107.4 kg)   08/16/22 244 lb (110.7 kg)     BP Readings from Last 3 Encounters:   10/14/22 130/64   08/16/22 (!) 141/72   04/05/22 132/68        Chief Complaint   Patient presents with    Pre-op Exam     Pre-op for left Cataract surgery on 11/2/22 by Dr. Christiano Pryor at [facility]     Allergies   Allergen Reactions    Morphine Other (See Comments)     Skin turns gray and feel like he's covered in ants    Morphine And Related Other (See Comments)     Feels like bugs are crawling all over it and skin gets grayish color     Outpatient Medications Marked as Taking for the 10/14/22 encounter (Office Visit) with Mel Knox MD   Medication Sig Dispense Refill    insulin glargine (BASAGLAR KWIKPEN) 100 UNIT/ML injection pen Inject 50 Units into the skin nightly      gabapentin (NEURONTIN) 600 MG tablet TAKE ONE TABLET BY MOUTH FOUR TIMES A  tablet 2    blood glucose test strips (ONETOUCH ULTRA) strip USE AND DISCARD 1 TEST     STRIP 3 TIMES A DAY. 300 strip 5    Semaglutide, 1 MG/DOSE, 4 MG/3ML SOPN Take 1 mg weekly dose 3 mL 3    empagliflozin (JARDIANCE) 10 MG tablet Take 1 tablet by mouth in the morning.  30 tablet 5    simvastatin (ZOCOR) 40 MG tablet TAKE ONE TABLET BY MOUTH EVERY EVENING 90 tablet 3    diclofenac (VOLTAREN) 50 MG EC tablet as needed      Insulin Pen Needle (BD PEN NEEDLE CHELSEA 2ND GEN) 32G X 4 MM MISC Inject 1 each into the skin 6 times daily 480 each 2    insulin lispro, 1 Unit Dial, (HUMALOG KWIKPEN) 100 UNIT/ML SOPN INJECT 10 TO 18 UNITS SUBCUTANEOUSLY 3 TIMES A  DAY BEFORE MEALS 45 mL 3 Multiple Vitamins-Minerals (MULTIVITAMIN ADULT PO) Take by mouth daily      ONETOUCH DELICA LANCETS 10R MISC Dx Code E 10.9 300 each 2    aspirin 81 MG tablet Take 81 mg by mouth daily         This patient presents to the office today for a preoperative consultation at the request of surgeon, Dr. Sally Merida, who plans on performing left cataract surgery on November 2 at [facility]. No chest pain or shortness of breath recently. He has history of DVT related to acute illness, no recurrence. He sees endocrinology for type 2 diabetes, he is on insulin and started Ozempic last month. Last A1c was 7.6, but since the Ozempic was started he has had a significant improvement in his sugars. He has lows a couple of times a week, average sugars around 100.     Planned anesthesia: Local   Known anesthesia problems: None   Bleeding risk: No recent or remote history of abnormal bleeding  Personal or FH of DVT/PE: Yes - in the setting of illness, not on blood thinners      Patient Active Problem List   Diagnosis    Paroxysmal atrial fibrillation (HCC)    History of pulmonary embolus (PE)    DVT (deep venous thrombosis) (HCC)    Diabetes mellitus ([address]    History of thrombosis    Mild nonproliferative diabetic retinopathy of both eyes without macular edema associated with type 2 diabetes mellitus ([address]    Hyperlipidemia    Subclinical hypothyroidism       Past Medical History:   Diagnosis Date    Chronic kidney disease     Diabetic eye exam ([address] 10/06/2017    Dr Herminio Whittington    Hyperlipidemia 3/13/2018    Mild nonproliferative diabetic retinopathy of both eyes without macular edema associated with type 2 diabetes mellitus ([address] 10/10/2017    Personal history of DVT (deep vein thrombosis)     Pulmonary emboli (HCC)     when hospitalized for DKA    Subclinical hypothyroidism 10/29/2018    Type 2 diabetes mellitus without complication ([address]     Type II or unspecified type diabetes mellitus without mention of complication, not stated as uncontrolled      Past Surgical History:   Procedure Laterality Date    APPENDECTOMY      COLONOSCOPY  05/20/2010    Dr. Belén GRIGGS  02/2016     Family History   Problem Relation Age of Onset    Cancer Mother         uterine (chemo/radiation done), leukemia 2006    Heart Surgery Father         valve replacement    Diabetes Brother         type 2     Social History     Socioeconomic History    Marital status:      Spouse name: Not on file    Number of children: Not on file    Years of education: Not on file    Highest education level: Not on file   Occupational History    Not on file   Tobacco Use    Smoking status: Never    Smokeless tobacco: Never   Vaping Use    Vaping Use: Never used   Substance and Sexual Activity    Alcohol use: Yes     Comment: social - maybe 1/day    Drug use: No    Sexual activity: Not on file   Other Topics Concern    Not on file   Social History Narrative    Not on file     Social Determinants of Health     Financial Resource Strain: Low Risk     Difficulty of Paying Living Expenses: Not hard at all   Food Insecurity: No Food Insecurity    Worried About Running Out of Food in the Last Year: Never true    Ran Out of Food in the Last Year: Never true   Transportation Needs: Not on file   Physical Activity: Not on file   Stress: Not on file   Social Connections: Not on file   Intimate Partner Violence: Not on file   Housing Stability: Not on file       Review of Systems  A comprehensive review of systems was negative except for what was noted in the HPI. Physical Exam   Constitutional: He is oriented to person, place, and time. He appears well-developed and well-nourished. No distress. HENT:   Head: Normocephalic and atraumatic. Eyes: Conjunctivae and EOM are normal. Pupils are equal, round, and reactive to light. Neck: Trachea normal and normal range of motion. Neck supple. No JVD present.  Carotid bruit is not present. No mass and no thyromegaly present. Cardiovascular: Normal rate, regular rhythm, normal heart sounds and intact distal pulses. Exam reveals no gallop and no friction rub. No murmur heard. Pulmonary/Chest: Effort normal and breath sounds normal. No respiratory distress. He has no wheezes. He has no rales. Abdominal: Soft. Normal aorta and bowel sounds are normal. He exhibits no distension and no mass. There is no hepatosplenomegaly. No tenderness. Musculoskeletal: He exhibits no edema and no tenderness. Neurological: He is alert and oriented to person, place, and time. He has normal strength. No cranial nerve deficit or sensory deficit. Coordination and gait normal.   Skin: Skin is warm and dry. No rash noted. No erythema. Psychiatric: He has a normal mood and affect. His behavior is normal.        Assessment:       71 y.o. patient with planned surgery as above. Known risk factors for perioperative complications: Diabetes mellitus  Current medications which may produce withdrawal symptoms if withheld perioperatively: none      Plan:     1. Preop examination  - Preoperative workup as follows: none  - Change in medication regimen before surgery: Hold all medications morning of surgery, he likely will need to decrease the insulin the night before due to potential for hypoglycemia. We will decrease by at least 10 units, if evening sugars under 100 reduce by at least half. - Prophylaxis for cardiac events with perioperative beta-blockers: Not indicated  - No contraindications to planned surgery    2. Cataract of left eye, unspecified cataract type  -Symptomatic, will have cataract surgery    3. Type 2 diabetes mellitus with diabetic polyneuropathy, with long-term current use of insulin (HCC)  -Significant improvement with Ozempic, adjust insulin as above preop    4.   Paroxysmal atrial fibrillation (HCC)  -Questionable atrial fibrillation occurred in the setting of PE, has had no recurrence

## 2022-12-13 ENCOUNTER — TELEMEDICINE (OUTPATIENT)
Dept: PRIMARY CARE CLINIC | Age: 72
End: 2022-12-13
Payer: COMMERCIAL

## 2022-12-13 DIAGNOSIS — J34.89 SINUS PAIN: ICD-10-CM

## 2022-12-13 DIAGNOSIS — J34.89 SINUS PRESSURE: ICD-10-CM

## 2022-12-13 DIAGNOSIS — R05.1 ACUTE COUGH: Primary | ICD-10-CM

## 2022-12-13 DIAGNOSIS — R09.81 NASAL CONGESTION: ICD-10-CM

## 2022-12-13 PROCEDURE — 1123F ACP DISCUSS/DSCN MKR DOCD: CPT | Performed by: NURSE PRACTITIONER

## 2022-12-13 PROCEDURE — 99213 OFFICE O/P EST LOW 20 MIN: CPT | Performed by: NURSE PRACTITIONER

## 2022-12-13 RX ORDER — DEXTROMETHORPHAN HYDROBROMIDE AND PROMETHAZINE HYDROCHLORIDE 15; 6.25 MG/5ML; MG/5ML
5 SYRUP ORAL 4 TIMES DAILY PRN
Qty: 120 ML | Refills: 0 | Status: SHIPPED | OUTPATIENT
Start: 2022-12-13

## 2022-12-13 RX ORDER — PREDNISONE 20 MG/1
20 TABLET ORAL 2 TIMES DAILY
Qty: 10 TABLET | Refills: 0 | Status: SHIPPED | OUTPATIENT
Start: 2022-12-13 | End: 2022-12-18

## 2022-12-13 RX ORDER — BENZONATATE 200 MG/1
200 CAPSULE ORAL 3 TIMES DAILY PRN
Qty: 30 CAPSULE | Refills: 0 | Status: SHIPPED | OUTPATIENT
Start: 2022-12-13 | End: 2022-12-23

## 2022-12-13 ASSESSMENT — ENCOUNTER SYMPTOMS
SINUS PAIN: 1
SINUS PRESSURE: 1
COUGH: 1

## 2022-12-13 NOTE — Clinical Note
I had the pleasure of seeing Reji Roper today for a primary care virtualist video visit secondary to cough/sinus pain and pressure. I have provided the following recommendations: See note. I have included my note for your review and have asked the patient to follow up with you if symptoms do not improve or if they worsen. If you have questions, please reach out via [employer] secure messaging by searching for the [facility] Primary Care Virtualists. Your communication will be answered promptly by the Virtualist on service for the day. Additionally, we would love your overall feedback on this visit. Please hit shift and click the following link to let us know if the Virtualist service met your expectations. [email or web address]   Electronically signed by MARIPOSA Peng CNP on 12/13/22 at 6:20 PM EST.

## 2022-12-13 NOTE — PROGRESS NOTES
Ruth Mckeon (:  1950) is a Established patient, here for evaluation of the following:Sinus pain and pressure in the maxillary and frontal area, head congestion, nasal congestion with yellow drainage, productive cough with yellow to white sputum noted slight sore throat x2 days X4 at home COVID test negative    Assessment & Plan   Below is the assessment and plan developed based on review of pertinent history, physical exam, labs, studies, and medications. 1. Acute cough  Problem  -     predniSONE (DELTASONE) 20 MG tablet; Take 1 tablet by mouth 2 times daily for 5 days, Disp-10 tablet, [id number] was instructed to monitor his blood sugars while taking the prednisone  -     promethazine-dextromethorphan (PROMETHAZINE-DM) 6.25-15 MG/5ML syrup; Take 5 mLs by mouth 4 times daily as needed for Cough, Disp-120 mL, R-0Normal  -     benzonatate (TESSALON) 200 MG capsule; Take 1 capsule by mouth 3 times daily as needed for Cough, Disp-30 capsule, R-0Normal  See patient instructions    2. Sinus pressure  Problem  Start using nasal sprays as indicated by the bottle    3. Sinus pain  Problem  See above    4. Nasal congestion  Problem  See above    It was explained to him that I did not want to start an antibiotic at this point. He was told to MyChart me on Friday and if he still experiencing symptoms we will consider antibiotic at that time    Follow-up with PCP if symptoms do not improve or if they worsen        Subjective   This is a 73-year-old male patient of Dr. Jessica Crow consenting to a virtual visit  He has complaints of sinus pain and pressure in the frontal and maxillary area along with nasal congestion with yellow drainage. He has had congestion, productive cough with yellow to white sputum noted and a slight sore throat x2 days. He has done for at home COVID test he states and they have been negative. He has been treating himself with Mucinex DM and Tylenol with little effect.     Review of Systems HENT:  Positive for congestion, sinus pressure and sinus pain. Respiratory:  Positive for cough. All other systems reviewed and are negative.       Objective   Patient-Reported Vitals  Patient-Reported Systolic (Top): 229 mmHg  Patient-Reported Diastolic (Bottom): 80 mmHg  Patient-Reported Pulse: 78  Patient-Reported Temperature: 97.6  Patient-Reported Weight: 226 pounds     Physical Exam  [INSTRUCTIONS:  \"[x]\" Indicates a positive item  \"[]\" Indicates a negative item  -- DELETE ALL ITEMS NOT EXAMINED]    Constitutional: [x] Appears well-developed and well-nourished [x] No apparent distress      [] Abnormal -     Mental status: [x] Alert and awake  [x] Oriented to person/place/time [x] Able to follow commands    [] Abnormal -     Eyes:   EOM    [x]  Normal    [] Abnormal -   Sclera  [x]  Normal    [] Abnormal -          Discharge [x]  None visible   [] Abnormal -     HENT: [x] Normocephalic, atraumatic  [] Abnormal -   [] Mouth/Throat: Mucous membranes are moist    External Ears [] Normal  [] Abnormal -    Neck: [x] No visualized mass [] Abnormal -     Pulmonary/Chest: [x] Respiratory effort normal   [x] No visualized signs of difficulty breathing or respiratory distress        [] Abnormal -      Musculoskeletal:   [] Normal gait with no signs of ataxia         [x] Normal range of motion of neck        [] Abnormal -     Neurological:        [x] No Facial Asymmetry (Cranial nerve 7 motor function) (limited exam due to video visit)          [] No gaze palsy        [] Abnormal -          Skin:        [x] No significant exanthematous lesions or discoloration noted on facial skin         [] Abnormal -            Psychiatric:       [x] Normal Affect [] Abnormal -           On this date 12/13/2022 I have spent 20 minutes reviewing previous notes, test results and face to face (virtual) with the patient discussing the diagnosis and importance of compliance with the treatment plan as well as documenting on the day of the visit. Junior Smith, was evaluated through a synchronous (real-time) audio-video encounter. The patient (or guardian if applicable) is aware that this is a billable service, which includes applicable co-pays. This Virtual Visit was conducted with patient's (and/or legal guardian's) consent. The visit was conducted pursuant to the emergency declaration under the [address] [place] [address] and the [employer] Act. Patient identification was verified, and a caregiver was present when appropriate. The patient was located at Home: [address] [place] [address].    [facility] Becka [id number] MARIPOSA Martin CNP

## 2022-12-16 ENCOUNTER — PATIENT MESSAGE (OUTPATIENT)
Dept: ENDOCRINOLOGY | Age: 72
End: 2022-12-16

## 2022-12-16 DIAGNOSIS — E11.42 TYPE 2 DIABETES MELLITUS WITH DIABETIC POLYNEUROPATHY, WITH LONG-TERM CURRENT USE OF INSULIN (HCC): Primary | ICD-10-CM

## 2022-12-16 DIAGNOSIS — Z79.4 TYPE 2 DIABETES MELLITUS WITH DIABETIC POLYNEUROPATHY, WITH LONG-TERM CURRENT USE OF INSULIN (HCC): Primary | ICD-10-CM

## 2022-12-16 NOTE — TELEPHONE ENCOUNTER
From: Tereza Bhagat  To: Dr. Mari Gtz  Sent: 12/16/2022 6:38 AM EST  Subject: prescription    I need my Ozempic refilled asap but have no refills left. Can you add multiple refills?

## 2022-12-17 ENCOUNTER — PATIENT MESSAGE (OUTPATIENT)
Dept: PRIMARY CARE CLINIC | Age: 72
End: 2022-12-17

## 2022-12-17 DIAGNOSIS — B96.89 ACUTE BACTERIAL BRONCHITIS: Primary | ICD-10-CM

## 2022-12-17 DIAGNOSIS — J20.8 ACUTE BACTERIAL BRONCHITIS: Primary | ICD-10-CM

## 2022-12-17 RX ORDER — AMOXICILLIN AND CLAVULANATE POTASSIUM 875; 125 MG/1; MG/1
1 TABLET, FILM COATED ORAL 2 TIMES DAILY
Qty: 20 TABLET | Refills: 0 | Status: SHIPPED | OUTPATIENT
Start: 2022-12-17 | End: 2022-12-27

## 2022-12-17 NOTE — TELEPHONE ENCOUNTER
From: Krystal Bolden  To: Italo Martin  Sent: 12/17/2022 10:00 AM EST  Subject: results     I have been taking the medications as described without good results. My sinuses are still stuffed up and now my chest feels very heavily congested making it somewhat hard to breathe. After speaking with you the other day, I did start running a fever of 110.7 but Tylenol took care of that. I don' t have the cough so much but still cough up thick phlegm a lot when it drops from my sinuses. Is it time for the Zpack?

## 2022-12-24 ENCOUNTER — PATIENT MESSAGE (OUTPATIENT)
Dept: ENDOCRINOLOGY | Age: 72
End: 2022-12-24

## 2022-12-24 DIAGNOSIS — E11.42 TYPE 2 DIABETES MELLITUS WITH DIABETIC POLYNEUROPATHY, WITH LONG-TERM CURRENT USE OF INSULIN (HCC): Primary | ICD-10-CM

## 2022-12-24 DIAGNOSIS — Z79.4 TYPE 2 DIABETES MELLITUS WITH DIABETIC POLYNEUROPATHY, WITH LONG-TERM CURRENT USE OF INSULIN (HCC): Primary | ICD-10-CM

## 2022-12-28 RX ORDER — INSULIN GLARGINE 100 [IU]/ML
50 INJECTION, SOLUTION SUBCUTANEOUS NIGHTLY
Qty: 45 ML | Refills: 1 | Status: SHIPPED | OUTPATIENT
Start: 2022-12-28

## 2022-12-28 NOTE — TELEPHONE ENCOUNTER
From: Ruth Porras  To: Dr. Donalee Kehr  Sent: 12/24/2022 10:32 AM EST  Subject: prescription    I do not have any more refills on my Basaglar Insulin. Could you please contact [facility] and send a new prescription? I only have two pens left.

## 2023-01-06 DIAGNOSIS — Z79.4 TYPE 2 DIABETES MELLITUS WITH DIABETIC POLYNEUROPATHY, WITH LONG-TERM CURRENT USE OF INSULIN (HCC): ICD-10-CM

## 2023-01-06 DIAGNOSIS — I10 ESSENTIAL HYPERTENSION: ICD-10-CM

## 2023-01-06 DIAGNOSIS — E78.5 HYPERLIPIDEMIA, UNSPECIFIED HYPERLIPIDEMIA TYPE: ICD-10-CM

## 2023-01-06 DIAGNOSIS — E11.42 TYPE 2 DIABETES MELLITUS WITH DIABETIC POLYNEUROPATHY, WITH LONG-TERM CURRENT USE OF INSULIN (HCC): ICD-10-CM

## 2023-01-06 DIAGNOSIS — E03.8 SUBCLINICAL HYPOTHYROIDISM: ICD-10-CM

## 2023-01-06 DIAGNOSIS — E55.9 VITAMIN D DEFICIENCY: ICD-10-CM

## 2023-01-06 LAB
A/G RATIO: 1.9 (ref 1.1–2.2)
ALBUMIN SERPL-MCNC: 4.3 G/DL (ref 3.4–5)
ALP BLD-CCNC: 101 U/L (ref 40–129)
ALT SERPL-CCNC: 30 U/L (ref 10–40)
ANION GAP SERPL CALCULATED.3IONS-SCNC: 14 MMOL/L (ref 3–16)
AST SERPL-CCNC: 19 U/L (ref 15–37)
BILIRUB SERPL-MCNC: 1.3 MG/DL (ref 0–1)
BUN BLDV-MCNC: 18 MG/DL (ref 7–20)
CALCIUM SERPL-MCNC: 9.1 MG/DL (ref 8.3–10.6)
CHLORIDE BLD-SCNC: 104 MMOL/L (ref 99–110)
CHOLESTEROL, TOTAL: 120 MG/DL (ref 0–199)
CO2: 26 MMOL/L (ref 21–32)
CREAT SERPL-MCNC: 1 MG/DL (ref 0.8–1.3)
CREATININE URINE: 127.3 MG/DL (ref 39–259)
GFR SERPL CREATININE-BSD FRML MDRD: >60 ML/MIN/{1.73_M2}
GLUCOSE BLD-MCNC: 98 MG/DL (ref 70–99)
HDLC SERPL-MCNC: 37 MG/DL (ref 40–60)
LDL CHOLESTEROL CALCULATED: 63 MG/DL
MICROALBUMIN UR-MCNC: <1.2 MG/DL
MICROALBUMIN/CREAT UR-RTO: NORMAL MG/G (ref 0–30)
POTASSIUM SERPL-SCNC: 5 MMOL/L (ref 3.5–5.1)
SODIUM BLD-SCNC: 144 MMOL/L (ref 136–145)
T3 TOTAL: 1.09 NG/ML (ref 0.8–2)
T4 FREE: 1.2 NG/DL (ref 0.9–1.8)
TOTAL PROTEIN: 6.6 G/DL (ref 6.4–8.2)
TRIGL SERPL-MCNC: 100 MG/DL (ref 0–150)
TSH SERPL DL<=0.05 MIU/L-ACNC: 4.11 UIU/ML (ref 0.27–4.2)
VITAMIN D 25-HYDROXY: 27 NG/ML
VLDLC SERPL CALC-MCNC: 20 MG/DL

## 2023-01-07 LAB
ESTIMATED AVERAGE GLUCOSE: 168.6 MG/DL
HBA1C MFR BLD: 7.5 %

## 2023-01-10 LAB — C-PEPTIDE: 1.3 NG/ML (ref 1.1–4.4)

## 2023-01-25 ENCOUNTER — TELEMEDICINE (OUTPATIENT)
Dept: ENDOCRINOLOGY | Age: 73
End: 2023-01-25
Payer: COMMERCIAL

## 2023-01-25 DIAGNOSIS — E11.42 TYPE 2 DIABETES MELLITUS WITH DIABETIC POLYNEUROPATHY, WITH LONG-TERM CURRENT USE OF INSULIN (HCC): Primary | ICD-10-CM

## 2023-01-25 DIAGNOSIS — Z79.4 TYPE 2 DIABETES MELLITUS WITH DIABETIC POLYNEUROPATHY, WITH LONG-TERM CURRENT USE OF INSULIN (HCC): Primary | ICD-10-CM

## 2023-01-25 PROCEDURE — 99214 OFFICE O/P EST MOD 30 MIN: CPT | Performed by: INTERNAL MEDICINE

## 2023-01-25 PROCEDURE — 3051F HG A1C>EQUAL 7.0%<8.0%: CPT | Performed by: INTERNAL MEDICINE

## 2023-01-25 PROCEDURE — 1123F ACP DISCUSS/DSCN MKR DOCD: CPT | Performed by: INTERNAL MEDICINE

## 2023-01-25 RX ORDER — SEMAGLUTIDE 2.68 MG/ML
INJECTION, SOLUTION SUBCUTANEOUS
Qty: 3 ML | Refills: 4 | Status: SHIPPED | OUTPATIENT
Start: 2023-01-25

## 2023-01-25 NOTE — Clinical Note
Please schedule patient a follow-up visit in 4 to 5 months I have placed the lab orders also please schedule patient for professional continuous glucose sensor

## 2023-01-25 NOTE — PROGRESS NOTES
Lennox Hook is a 71 y.o. male seen  for management of uncontrolled type 2 diabetes . Patient has a PMH of Type 2 DM, hypertension, hyperlipidemia, DKA, BPH, PE, DVT     Diagnosed with Diabetes Mellitus  in 2004,    In 2016, he was having polyuria, polydypsia, dry mouth. Went to ER at [facility] and was found to have UTI and DKA. BS were >800. Oral meds were stopped and he was started in insulin. Course has been variable . Microvascular complications:   Estb with CEI and is + retinopathy s/p laser  (Last eye exam: 10/17, 10/18, 06/20 ), Peripheral neuropathy. No nephropathy . Previous medications: metformin, glimipiride. Subclinical hypothyroidism :   TSH has been high normal.  no other symptoms suggestive of  hypothyroidism  No FH of thyroid disease. Weight is stable. Hyperlipidemia: Currently is on Simvastatin 40 mg daily, denies myalgias and  otherwise tolerates well. Vitamin D deficiency: Currently is on no supplementation. Current complaints include fatigue on daily basis. Hypertension  Controlled , denies symptoms of dizziness, light headedness. Occasional dependent edema. Tries to follow a salt restricted diet.      INTERIM     Has lost 20+ pounds since started GLP-1 agonist and is noting occasional hypoglycemia      Tobacco/Alcohol History:   Tobacco Use    Smoking status: Never Smoker    Smokeless tobacco: Never Used   Substance and Sexual Activity    Alcohol use: Yes     Comment: social - maybe 1/day       PMH includes  Past Medical History:   Diagnosis Date    Chronic kidney disease     Diabetic eye exam ([facility] [address] 10/06/2017    Dr Thierno Cedillo    Hyperlipidemia 3/13/2018    Mild nonproliferative diabetic retinopathy of both eyes without macular edema associated with type 2 diabetes mellitus ([facility] [address] 10/10/2017    Personal history of DVT (deep vein thrombosis)     Pulmonary emboli (HCC)     when hospitalized for DKA    Subclinical hypothyroidism 10/29/2018    Type 2 diabetes mellitus without complication ([facility] [address]     Type II or unspecified type diabetes mellitus without mention of complication, not stated as uncontrolled      Family History   Problem Relation Age of Onset    Cancer Mother         uterine (chemo/radiation done), leukemia 2006    Heart Surgery Father         valve replacement    Diabetes Brother         type 2           There were no vitals filed for this visit.       Constitutional: no acute distress, well appearing and well nourished  Psychiatric: oriented to person, place and time, judgement and insight and normal, recent and remote memory intact and mood and affect are normal  Skin: skin and subcutaneous tissue is normal without visible mass,   Head and Face: visual inspection  of head and face revealed no abnormalities  Eyes: visual inspection showed no lid or conjunctival swelling, erythema or discharge, pupils are normal, equal, round  Ears/Nose: external inspection of ears and nose revealed no abnormalities, hearing is grossly normal  Oropharynx/Mouth/Face: lips, tongue and gums appear  normal with no lesions, the voice quality was normal  Neck: neck appears symmetric, with no visible masses,   Pulmonary: no increased work of breathing or signs of respiratory distress,  Musculoskeletal: normal on inspection    Neurological: normal coordination and normal general cortical function        Assessment  Mariusz Mathews is a 71 y.o. male with uncontrolled Diabetes Mellitus type 2 complicated by retinopathy and peripheral neuropathy and associated with HTN, HLD, vitamin  D deficiency, subclinical hypothyroidism    Plan   --unocntrolled Diabetes Mellitus Type 2 after his DKA in 2016 he had one low C-peptide reading but his repeat C-peptide reading was 4.6 in 2019, with negative janes antibodies    I have made the following changes in the insulin regimen and asked him to reduce the doses as follows due to occasional hypoglycemia he is having since he has lost 20+ pounds  Basaglar 50 units daily >>40   Humalog 12--15 units with each meal .>>8   He is on 1 mg Ozempic will increase the dose to 2 mg weekly. Patient is on on Jardiance 10 mg daily. Diet :   30-40 grams per meal , 3 meals per day, avoid carbs in snack choices  Include moderate proteins and good fats   Ensure adequate hydration and  electrolyte replacement    Exercise :  Previously following recommendations were made exercise is 5-7 days a week for 30-60 mins at least, per day OR a total of 2.5 hours per week , which ever is more feasible. Ambulate as possible     Diabetic Health Maintenance  Follow up with annual eye exams last feb 2022 retinopathy s/p laser left eye years ago   Follow up with annual podiatry exams if needed  Reviewed sick day management of BG     Other areas of Diabetic Education reviewed:  Carbs: good carbs and bad carbs, importance of carb counting, incorporation of protein with each meal to reduce Glycemic index, importance of portions, Carb/insulin ratio  Fats: Good fats and bad fats, meal planning and supplements. Discussed how food affects blood sugar readings. Different diabetic medications  Managing high and low sugar readings  Rotation of sites for subcutaneous medication injection    Mixed Hyperlipidemia  LDL goal  < 100;   TG not elevated at 84  Continue current regimen    Essential Hypertension  Blood pressure controlled. Continue current regimen    Vitamin D deficiency  Recheck levels  Supplement 1000 IU QD    SUBCLINICAL HYPOTHYROIDISM   Thyroid Ab negative   TSH 8.13     TELEHEALTH EVALUATION -- Audio/Visual (During LCLRA-29 public health emergency)  Pursuant to the emergency declaration under the [facility], [facility] waiver authority and the [employer] General Act, this Virtual  Visit was conducted, with patient's consent, to reduce the patient's risk of exposure to COVID-19 and provide care for  patient. Patient identification was verified and the caregiver was present when appropriate. The patient was located in the state where the provider is licensed to practice. The patient and/or guardian are aware that this is a billable service which includes applicable co-pays. This virtual/audio visit was conducted with patient and/or legal guardian's consent. Total time spent : 30+ reviewing the chart, conducting an interview, performing a limited exam by video and educating the patient on my assessment plan. Return in about 3 months (around 4/25/2023).

## 2023-01-27 ENCOUNTER — NURSE ONLY (OUTPATIENT)
Dept: ENDOCRINOLOGY | Age: 73
End: 2023-01-27

## 2023-01-27 DIAGNOSIS — E11.9 TYPE 2 DIABETES MELLITUS WITHOUT COMPLICATION, UNSPECIFIED WHETHER LONG TERM INSULIN USE (HCC): Primary | ICD-10-CM

## 2023-01-27 NOTE — PROGRESS NOTES
Patient presents for a Dexcom Pro placement. Sensor placed on the abdomen with no issues. Sensor activated.  Patient will come in next week for removal.

## 2023-02-03 ENCOUNTER — TELEPHONE (OUTPATIENT)
Dept: ENDOCRINOLOGY | Age: 73
End: 2023-02-03
Payer: COMMERCIAL

## 2023-02-03 DIAGNOSIS — E11.9 TYPE 2 DIABETES MELLITUS WITHOUT COMPLICATION, UNSPECIFIED WHETHER LONG TERM INSULIN USE (HCC): Primary | ICD-10-CM

## 2023-02-03 PROCEDURE — 95251 CONT GLUC MNTR ANALYSIS I&R: CPT | Performed by: INTERNAL MEDICINE

## 2023-02-06 NOTE — TELEPHONE ENCOUNTER
Diabetes Continuous Glucose Monitoring Report         Reason for Study:     - improve diabetic control without risk of hypoglycemia       Current Medication regimen:   Basal insulin with Ozempic     CGMS Report   CGMS insertion date January 27, 2023  CGMS data collection was performed on February 3, 2023  Patient provided information on his  diet, activities and insulin dosing  during this period. Data was available for 7 days     Sensor Data Report:   - 12 AM to 6 AM: Overnight blood glucose pattern shows stable glycemia  - 6   AM to 10 AM:  Post breakfast hyperglycemia is noted  --10AM to 5 PM : No hypoglycemia observed during this time.   - 5   PM to 8 PM: Post meal hyperglycemia is noted       Average reading 168 mg/dL     Standard Dev   43 mg/dL   % of time <70 mg/dL 0   % of time >180  mg/dL 35%   % of time within range 65%  Number of hypoglycemia episodes noted: 0     Impression:   CGMS shows stable glycemia overnight but postprandial hyperglycemia     Recommendation:      Patient was advised to make the following changes in his diabetic regimen  Reduce the amount of carbohydrates with the meal increase the Ozempic to 2 mg weekly

## 2023-02-07 NOTE — TELEPHONE ENCOUNTER
Pt called back. Gave him message verbatim. He states he is tolerating the Ozempic , no side effects.  No questions or concerns

## 2023-04-04 DIAGNOSIS — E11.42 TYPE 2 DIABETES MELLITUS WITH DIABETIC POLYNEUROPATHY, WITH LONG-TERM CURRENT USE OF INSULIN (HCC): ICD-10-CM

## 2023-04-04 DIAGNOSIS — Z79.4 TYPE 2 DIABETES MELLITUS WITH DIABETIC POLYNEUROPATHY, WITH LONG-TERM CURRENT USE OF INSULIN (HCC): ICD-10-CM

## 2023-04-04 RX ORDER — EMPAGLIFLOZIN 10 MG/1
TABLET, FILM COATED ORAL
Qty: 30 TABLET | Refills: 5 | Status: SHIPPED | OUTPATIENT
Start: 2023-04-04

## 2023-04-04 RX ORDER — GABAPENTIN 600 MG/1
TABLET ORAL
Qty: 120 TABLET | Refills: 3 | Status: SHIPPED | OUTPATIENT
Start: 2023-04-04 | End: 2023-10-04

## 2023-05-04 ENCOUNTER — TELEPHONE (OUTPATIENT)
Dept: ENDOCRINOLOGY | Age: 73
End: 2023-05-04

## 2023-05-04 ENCOUNTER — PATIENT MESSAGE (OUTPATIENT)
Dept: ENDOCRINOLOGY | Age: 73
End: 2023-05-04

## 2023-05-04 DIAGNOSIS — E11.42 TYPE 2 DIABETES MELLITUS WITH DIABETIC POLYNEUROPATHY, WITH LONG-TERM CURRENT USE OF INSULIN (HCC): Primary | ICD-10-CM

## 2023-05-04 DIAGNOSIS — Z79.4 TYPE 2 DIABETES MELLITUS WITH DIABETIC POLYNEUROPATHY, WITH LONG-TERM CURRENT USE OF INSULIN (HCC): Primary | ICD-10-CM

## 2023-05-04 RX ORDER — BLOOD-GLUCOSE SENSOR
EACH MISCELLANEOUS
Qty: 9 EACH | Refills: 3 | Status: SHIPPED | OUTPATIENT
Start: 2023-05-04

## 2023-05-04 RX ORDER — BLOOD-GLUCOSE,RECEIVER,CONT
EACH MISCELLANEOUS
Qty: 1 EACH | Refills: 0 | Status: SHIPPED | OUTPATIENT
Start: 2023-05-04

## 2023-05-04 NOTE — TELEPHONE ENCOUNTER
From: Kenna López  To: Dr. Ricky Stephens  Sent: 5/4/2023 9:13 AM EDT  Subject: Dexcom    Doctor,   I was wondering if I need a prescription for the Dexcom device or I get this from your office? I would like to get this before next month if I could.

## 2023-06-21 RX ORDER — SIMVASTATIN 40 MG
TABLET ORAL
Qty: 90 TABLET | Refills: 1 | Status: SHIPPED | OUTPATIENT
Start: 2023-06-21

## 2023-06-23 DIAGNOSIS — Z79.4 TYPE 2 DIABETES MELLITUS WITH DIABETIC POLYNEUROPATHY, WITH LONG-TERM CURRENT USE OF INSULIN (HCC): ICD-10-CM

## 2023-06-23 DIAGNOSIS — E11.42 TYPE 2 DIABETES MELLITUS WITH DIABETIC POLYNEUROPATHY, WITH LONG-TERM CURRENT USE OF INSULIN (HCC): ICD-10-CM

## 2023-06-23 LAB
ALBUMIN SERPL-MCNC: 4.5 G/DL (ref 3.4–5)
ALBUMIN/GLOB SERPL: 2.4 {RATIO} (ref 1.1–2.2)
ALP SERPL-CCNC: 94 U/L (ref 40–129)
ALT SERPL-CCNC: 27 U/L (ref 10–40)
ANION GAP SERPL CALCULATED.3IONS-SCNC: 9 MMOL/L (ref 3–16)
AST SERPL-CCNC: 20 U/L (ref 15–37)
BILIRUB SERPL-MCNC: 1.3 MG/DL (ref 0–1)
BUN SERPL-MCNC: 15 MG/DL (ref 7–20)
CALCIUM SERPL-MCNC: 9 MG/DL (ref 8.3–10.6)
CHLORIDE SERPL-SCNC: 102 MMOL/L (ref 99–110)
CHOLEST SERPL-MCNC: 114 MG/DL (ref 0–199)
CO2 SERPL-SCNC: 27 MMOL/L (ref 21–32)
CREAT SERPL-MCNC: 1.1 MG/DL (ref 0.8–1.3)
CREAT UR-MCNC: 71.5 MG/DL (ref 39–259)
GFR SERPLBLD CREATININE-BSD FMLA CKD-EPI: >60 ML/MIN/{1.73_M2}
GLUCOSE SERPL-MCNC: 115 MG/DL (ref 70–99)
HDLC SERPL-MCNC: 39 MG/DL (ref 40–60)
LDLC SERPL CALC-MCNC: 58 MG/DL
MICROALBUMIN UR DL<=1MG/L-MCNC: <1.2 MG/DL
MICROALBUMIN/CREAT UR: NORMAL MG/G (ref 0–30)
POTASSIUM SERPL-SCNC: 4.5 MMOL/L (ref 3.5–5.1)
PROT SERPL-MCNC: 6.4 G/DL (ref 6.4–8.2)
SODIUM SERPL-SCNC: 138 MMOL/L (ref 136–145)
TRIGL SERPL-MCNC: 83 MG/DL (ref 0–150)
TSH SERPL DL<=0.005 MIU/L-ACNC: 3.4 UIU/ML (ref 0.27–4.2)
VLDLC SERPL CALC-MCNC: 17 MG/DL

## 2023-06-24 LAB
EST. AVERAGE GLUCOSE BLD GHB EST-MCNC: 148.5 MG/DL
HBA1C MFR BLD: 6.8 %

## 2023-06-27 ENCOUNTER — TELEPHONE (OUTPATIENT)
Dept: ENDOCRINOLOGY | Age: 73
End: 2023-06-27

## 2023-06-27 ENCOUNTER — OFFICE VISIT (OUTPATIENT)
Dept: ENDOCRINOLOGY | Age: 73
End: 2023-06-27
Payer: COMMERCIAL

## 2023-06-27 VITALS
WEIGHT: 219 LBS | BODY MASS INDEX: 28.11 KG/M2 | RESPIRATION RATE: 14 BRPM | TEMPERATURE: 98 F | HEIGHT: 74 IN | SYSTOLIC BLOOD PRESSURE: 136 MMHG | DIASTOLIC BLOOD PRESSURE: 80 MMHG | HEART RATE: 74 BPM

## 2023-06-27 DIAGNOSIS — E11.9 CONTROLLED TYPE 2 DIABETES MELLITUS WITHOUT COMPLICATION, WITH LONG-TERM CURRENT USE OF INSULIN (HCC): Primary | ICD-10-CM

## 2023-06-27 DIAGNOSIS — Z79.4 CONTROLLED TYPE 2 DIABETES MELLITUS WITHOUT COMPLICATION, WITH LONG-TERM CURRENT USE OF INSULIN (HCC): Primary | ICD-10-CM

## 2023-06-27 DIAGNOSIS — E11.3293 MILD NONPROLIFERATIVE DIABETIC RETINOPATHY OF BOTH EYES WITHOUT MACULAR EDEMA ASSOCIATED WITH TYPE 2 DIABETES MELLITUS (HCC): ICD-10-CM

## 2023-06-27 DIAGNOSIS — E78.2 MIXED HYPERLIPIDEMIA: ICD-10-CM

## 2023-06-27 DIAGNOSIS — I10 ESSENTIAL HYPERTENSION: ICD-10-CM

## 2023-06-27 DIAGNOSIS — E03.8 SUBCLINICAL HYPOTHYROIDISM: ICD-10-CM

## 2023-06-27 PROCEDURE — 99214 OFFICE O/P EST MOD 30 MIN: CPT | Performed by: INTERNAL MEDICINE

## 2023-06-27 PROCEDURE — 3075F SYST BP GE 130 - 139MM HG: CPT | Performed by: INTERNAL MEDICINE

## 2023-06-27 PROCEDURE — 3044F HG A1C LEVEL LT 7.0%: CPT | Performed by: INTERNAL MEDICINE

## 2023-06-27 PROCEDURE — 3079F DIAST BP 80-89 MM HG: CPT | Performed by: INTERNAL MEDICINE

## 2023-06-27 PROCEDURE — 1123F ACP DISCUSS/DSCN MKR DOCD: CPT | Performed by: INTERNAL MEDICINE

## 2023-07-17 RX ORDER — SEMAGLUTIDE 2.68 MG/ML
INJECTION, SOLUTION SUBCUTANEOUS
Qty: 3 ML | Refills: 4 | Status: SHIPPED | OUTPATIENT
Start: 2023-07-17

## 2023-07-30 DIAGNOSIS — E11.42 TYPE 2 DIABETES MELLITUS WITH DIABETIC POLYNEUROPATHY, WITH LONG-TERM CURRENT USE OF INSULIN (HCC): ICD-10-CM

## 2023-07-30 DIAGNOSIS — Z79.4 TYPE 2 DIABETES MELLITUS WITH DIABETIC POLYNEUROPATHY, WITH LONG-TERM CURRENT USE OF INSULIN (HCC): ICD-10-CM

## 2023-07-31 RX ORDER — INSULIN GLARGINE 100 [IU]/ML
INJECTION, SOLUTION SUBCUTANEOUS
Qty: 45 ML | Refills: 1 | Status: SHIPPED | OUTPATIENT
Start: 2023-07-31

## 2023-10-11 DIAGNOSIS — Z79.4 TYPE 2 DIABETES MELLITUS WITH DIABETIC POLYNEUROPATHY, WITH LONG-TERM CURRENT USE OF INSULIN (HCC): ICD-10-CM

## 2023-10-11 DIAGNOSIS — E11.42 TYPE 2 DIABETES MELLITUS WITH DIABETIC POLYNEUROPATHY, WITH LONG-TERM CURRENT USE OF INSULIN (HCC): ICD-10-CM

## 2023-10-11 RX ORDER — SEMAGLUTIDE 1.34 MG/ML
INJECTION, SOLUTION SUBCUTANEOUS
Qty: 9 ML | Refills: 1 | OUTPATIENT
Start: 2023-10-11

## 2023-10-24 ENCOUNTER — OFFICE VISIT (OUTPATIENT)
Dept: ENDOCRINOLOGY | Age: 73
End: 2023-10-24
Payer: COMMERCIAL

## 2023-10-24 VITALS
TEMPERATURE: 98 F | BODY MASS INDEX: 27.34 KG/M2 | HEIGHT: 74 IN | HEART RATE: 70 BPM | SYSTOLIC BLOOD PRESSURE: 130 MMHG | DIASTOLIC BLOOD PRESSURE: 82 MMHG | RESPIRATION RATE: 14 BRPM | WEIGHT: 213 LBS

## 2023-10-24 DIAGNOSIS — E11.42 TYPE 2 DIABETES MELLITUS WITH DIABETIC POLYNEUROPATHY, WITH LONG-TERM CURRENT USE OF INSULIN (HCC): Primary | ICD-10-CM

## 2023-10-24 DIAGNOSIS — E03.8 SUBCLINICAL HYPOTHYROIDISM: ICD-10-CM

## 2023-10-24 DIAGNOSIS — Z79.4 TYPE 2 DIABETES MELLITUS WITH DIABETIC POLYNEUROPATHY, WITH LONG-TERM CURRENT USE OF INSULIN (HCC): Primary | ICD-10-CM

## 2023-10-24 DIAGNOSIS — E78.5 HYPERLIPIDEMIA, UNSPECIFIED HYPERLIPIDEMIA TYPE: ICD-10-CM

## 2023-10-24 PROCEDURE — 1123F ACP DISCUSS/DSCN MKR DOCD: CPT | Performed by: INTERNAL MEDICINE

## 2023-10-24 PROCEDURE — 3044F HG A1C LEVEL LT 7.0%: CPT | Performed by: INTERNAL MEDICINE

## 2023-10-24 PROCEDURE — 99214 OFFICE O/P EST MOD 30 MIN: CPT | Performed by: INTERNAL MEDICINE

## 2023-10-24 NOTE — PROGRESS NOTES
Daniela Leonard is a 68 y.o. male seen  for management of uncontrolled type 2 diabetes . Patient has a PMH of Type 2 DM, hypertension, hyperlipidemia, DKA, BPH, PE, DVT   Diagnosed with Diabetes Mellitus  in 2004,  In 2016, he was having polyuria, polydypsia, dry mouth. Went to ER at [facility] and was found to have UTI and DKA. BS were >800. Oral meds were stopped and he was started in insulin. Course has been variable . Microvascular complications:   Estb with CEI and is + retinopathy s/p laser  (Last eye exam: 10/17, 10/18, 06/20 ), Peripheral neuropathy. No nephropathy . Previous medications: metformin, glimipiride. Subclinical hypothyroidism :   TSH has been high normal.  no other symptoms suggestive of  hypothyroidism  No FH of thyroid disease. Weight is stable. Hyperlipidemia: Currently is on Simvastatin 40 mg daily, denies myalgias and  otherwise tolerates well. Vitamin D deficiency: Currently is on no supplementation. Current complaints include fatigue on daily basis. Hypertension  Controlled , denies symptoms of dizziness, light headedness. Occasional dependent edema. Tries to follow a salt restricted diet.      INTERIM     Denies any hypoglycemia   Ozempic 2 mg not available so has been using 1 mg dose       Tobacco/Alcohol History:   Tobacco Use    Smoking status: Never Smoker    Smokeless tobacco: Never Used   Substance and Sexual Activity    Alcohol use: Yes     Comment: social - maybe 1/day       PMH includes  Past Medical History:   Diagnosis Date    Chronic kidney disease     Diabetic eye exam ([address]) 10/06/2017    Dr Veronica Newton    Hyperlipidemia 3/13/2018    Mild nonproliferative diabetic retinopathy of both eyes without macular edema associated with type 2 diabetes mellitus ([address]) 10/10/2017    Personal history of DVT (deep vein thrombosis)     Pulmonary emboli (HCC)     when hospitalized for DKA    Subclinical hypothyroidism 10/29/2018    Type 2 diabetes mellitus without

## 2023-11-27 DIAGNOSIS — E11.42 TYPE 2 DIABETES MELLITUS WITH DIABETIC POLYNEUROPATHY, WITH LONG-TERM CURRENT USE OF INSULIN (HCC): ICD-10-CM

## 2023-11-27 DIAGNOSIS — Z79.4 TYPE 2 DIABETES MELLITUS WITH DIABETIC POLYNEUROPATHY, WITH LONG-TERM CURRENT USE OF INSULIN (HCC): ICD-10-CM

## 2023-11-27 RX ORDER — EMPAGLIFLOZIN 10 MG/1
TABLET, FILM COATED ORAL
Qty: 30 TABLET | Refills: 5 | Status: SHIPPED | OUTPATIENT
Start: 2023-11-27

## 2023-12-11 DIAGNOSIS — E11.42 TYPE 2 DIABETES MELLITUS WITH DIABETIC POLYNEUROPATHY, WITH LONG-TERM CURRENT USE OF INSULIN (HCC): ICD-10-CM

## 2023-12-11 DIAGNOSIS — Z79.4 TYPE 2 DIABETES MELLITUS WITH DIABETIC POLYNEUROPATHY, WITH LONG-TERM CURRENT USE OF INSULIN (HCC): ICD-10-CM

## 2023-12-11 RX ORDER — GABAPENTIN 600 MG/1
TABLET ORAL
Qty: 120 TABLET | Refills: 1 | Status: SHIPPED | OUTPATIENT
Start: 2023-12-11 | End: 2024-03-12

## 2023-12-26 RX ORDER — SIMVASTATIN 40 MG
TABLET ORAL
Qty: 90 TABLET | Refills: 1 | OUTPATIENT
Start: 2023-12-26

## 2023-12-28 RX ORDER — SIMVASTATIN 40 MG
TABLET ORAL
Qty: 90 TABLET | Refills: 1 | OUTPATIENT
Start: 2023-12-28

## 2023-12-29 ENCOUNTER — PATIENT MESSAGE (OUTPATIENT)
Dept: INTERNAL MEDICINE CLINIC | Age: 73
End: 2023-12-29

## 2024-01-02 NOTE — TELEPHONE ENCOUNTER
From: Brad Flores  To: Dr. Nancie Payne  Sent: 12/29/2023 3:09 PM EST  Subject: Simvastatin    Dr. Payne,  I have been trying to refill my prescription for simvastatin for over a week now without luck. Could you please contact the Pharmacy in [facility] on [address] and give them a new prescription?

## 2024-01-04 ENCOUNTER — PATIENT MESSAGE (OUTPATIENT)
Dept: ENDOCRINOLOGY | Age: 74
End: 2024-01-04

## 2024-01-04 DIAGNOSIS — Z79.4 TYPE 2 DIABETES MELLITUS WITH DIABETIC POLYNEUROPATHY, WITH LONG-TERM CURRENT USE OF INSULIN (HCC): ICD-10-CM

## 2024-01-04 DIAGNOSIS — E11.42 TYPE 2 DIABETES MELLITUS WITH DIABETIC POLYNEUROPATHY, WITH LONG-TERM CURRENT USE OF INSULIN (HCC): ICD-10-CM

## 2024-01-04 RX ORDER — PEN NEEDLE, DIABETIC 32GX 5/32"
1 NEEDLE, DISPOSABLE MISCELLANEOUS
Qty: 480 EACH | Refills: 2 | Status: SHIPPED | OUTPATIENT
Start: 2024-01-04

## 2024-01-04 NOTE — TELEPHONE ENCOUNTER
From: Brad Flores  To: Dr. Samantha Melendez  Sent: 1/4/2024 5:19 AM EST  Subject: test strip refill    Dr. Melendez,  Could you please send new prescriptions for test strips and 4mm needles to [facility]?

## 2024-01-08 ENCOUNTER — PATIENT MESSAGE (OUTPATIENT)
Dept: ENDOCRINOLOGY | Age: 74
End: 2024-01-08

## 2024-01-08 DIAGNOSIS — E10.42 TYPE 1 DIABETES MELLITUS WITH DIABETIC POLYNEUROPATHY (HCC): ICD-10-CM

## 2024-01-08 RX ORDER — BLOOD SUGAR DIAGNOSTIC
STRIP MISCELLANEOUS
Qty: 300 STRIP | Refills: 5 | Status: SHIPPED | OUTPATIENT
Start: 2024-01-08 | End: 2024-01-08

## 2024-01-08 RX ORDER — BLOOD SUGAR DIAGNOSTIC
STRIP MISCELLANEOUS
Qty: 300 STRIP | Refills: 3 | Status: SHIPPED | OUTPATIENT
Start: 2024-01-08

## 2024-01-08 NOTE — TELEPHONE ENCOUNTER
From: Brad Flores  To: Dr. Samantha Melendez  Sent: 1/8/2024 2:31 PM EST  Subject: test strip refill    Dr. Melendez,  Could you please send in a prescription for One Touch Ultra test strips to [facility]? I received the pen needles but not the strips.

## 2024-01-14 ASSESSMENT — PATIENT HEALTH QUESTIONNAIRE - PHQ9
SUM OF ALL RESPONSES TO PHQ9 QUESTIONS 1 & 2: 0
1. LITTLE INTEREST OR PLEASURE IN DOING THINGS: NOT AT ALL
SUM OF ALL RESPONSES TO PHQ QUESTIONS 1-9: 0
2. FEELING DOWN, DEPRESSED OR HOPELESS: NOT AT ALL
2. FEELING DOWN, DEPRESSED OR HOPELESS: 0
SUM OF ALL RESPONSES TO PHQ QUESTIONS 1-9: 0
SUM OF ALL RESPONSES TO PHQ QUESTIONS 1-9: 0
SUM OF ALL RESPONSES TO PHQ9 QUESTIONS 1 & 2: 0
1. LITTLE INTEREST OR PLEASURE IN DOING THINGS: 0
SUM OF ALL RESPONSES TO PHQ QUESTIONS 1-9: 0

## 2024-01-17 ENCOUNTER — OFFICE VISIT (OUTPATIENT)
Dept: INTERNAL MEDICINE CLINIC | Age: 74
End: 2024-01-17
Payer: COMMERCIAL

## 2024-01-17 VITALS — DIASTOLIC BLOOD PRESSURE: 70 MMHG | BODY MASS INDEX: 26.96 KG/M2 | SYSTOLIC BLOOD PRESSURE: 134 MMHG | WEIGHT: 210 LBS

## 2024-01-17 DIAGNOSIS — Z79.4 TYPE 2 DIABETES MELLITUS WITH DIABETIC POLYNEUROPATHY, WITH LONG-TERM CURRENT USE OF INSULIN (HCC): ICD-10-CM

## 2024-01-17 DIAGNOSIS — Z00.00 WELL ADULT EXAM: Primary | ICD-10-CM

## 2024-01-17 DIAGNOSIS — Z12.5 ENCOUNTER FOR SCREENING FOR MALIGNANT NEOPLASM OF PROSTATE: ICD-10-CM

## 2024-01-17 DIAGNOSIS — I48.0 PAROXYSMAL ATRIAL FIBRILLATION (HCC): ICD-10-CM

## 2024-01-17 DIAGNOSIS — E11.42 TYPE 2 DIABETES MELLITUS WITH DIABETIC POLYNEUROPATHY, WITH LONG-TERM CURRENT USE OF INSULIN (HCC): ICD-10-CM

## 2024-01-17 DIAGNOSIS — E03.8 SUBCLINICAL HYPOTHYROIDISM: ICD-10-CM

## 2024-01-17 PROCEDURE — 99397 PER PM REEVAL EST PAT 65+ YR: CPT | Performed by: INTERNAL MEDICINE

## 2024-01-17 RX ORDER — SIMVASTATIN 40 MG
40 TABLET ORAL NIGHTLY
Qty: 90 TABLET | Refills: 3 | Status: SHIPPED | OUTPATIENT
Start: 2024-01-17

## 2024-01-17 SDOH — ECONOMIC STABILITY: FOOD INSECURITY: WITHIN THE PAST 12 MONTHS, THE FOOD YOU BOUGHT JUST DIDN'T LAST AND YOU DIDN'T HAVE MONEY TO GET MORE.: NEVER TRUE

## 2024-01-17 SDOH — ECONOMIC STABILITY: HOUSING INSECURITY
IN THE LAST 12 MONTHS, WAS THERE A TIME WHEN YOU DID NOT HAVE A STEADY PLACE TO SLEEP OR SLEPT IN A SHELTER (INCLUDING NOW)?: NO

## 2024-01-17 SDOH — ECONOMIC STABILITY: INCOME INSECURITY: HOW HARD IS IT FOR YOU TO PAY FOR THE VERY BASICS LIKE FOOD, HOUSING, MEDICAL CARE, AND HEATING?: NOT HARD AT ALL

## 2024-01-17 SDOH — ECONOMIC STABILITY: FOOD INSECURITY: WITHIN THE PAST 12 MONTHS, YOU WORRIED THAT YOUR FOOD WOULD RUN OUT BEFORE YOU GOT MONEY TO BUY MORE.: NEVER TRUE

## 2024-01-17 ASSESSMENT — ENCOUNTER SYMPTOMS
SHORTNESS OF BREATH: 0
SINUS PAIN: 0
DIARRHEA: 0
CONSTIPATION: 0
BACK PAIN: 0
COUGH: 0

## 2024-01-17 NOTE — PROGRESS NOTES
2024    Brad Flores (:  1950) kelsie 73 y.o. male, here for a preventive medicine evaluation.    Patient Active Problem List   Diagnosis    Paroxysmal atrial fibrillation (HCC)    History of pulmonary embolus (PE)    Diabetes mellitus (HCC)    History of thrombosis    Mild nonproliferative diabetic retinopathy of both eyes without macular edema associated with type 2 diabetes mellitus (HCC)    Hyperlipidemia    Subclinical hypothyroidism     Overall doing well, no significant change  On Ozempic 1mg for diabetes, had been on 2 mg but went back down due to shortage. This is managed by his endocrinologist.  He is very active, hikes regularly.   No chest pain or shortness of breath, palpitations.  Will be coming due for another colonoscopy.    Review of Systems   Constitutional:  Negative for fatigue and unexpected weight change.   HENT:  Negative for ear pain and sinus pain.    Eyes:  Negative for visual disturbance.   Respiratory:  Negative for cough and shortness of breath.    Cardiovascular:  Negative for chest pain, palpitations and leg swelling.   Gastrointestinal:  Negative for constipation and diarrhea.   Genitourinary:  Negative for difficulty urinating and frequency.   Musculoskeletal:  Negative for arthralgias and back pain.   Skin:  Negative for rash.   Neurological:  Positive for numbness (neuropathy). Negative for weakness.   Psychiatric/Behavioral:  Negative for dysphoric mood. The patient is not nervous/anxious.         Prior to Visit Medications    Medication Sig Taking? Authorizing Provider   simvastatin (ZOCOR) 40 MG tablet Take 1 tablet by mouth nightly Yes Nancie Payne MD   blood glucose test strips (ONETOUCH ULTRA) strip USE AND DISCARD 1 TEST     STRIP 3 TIMES DAILY Yes Samantha Melendez MD   Insulin Pen Needle (BD PEN NEEDLE CHELSEA 2ND GEN) 32G X 4 MM MISC Inject 1 each into the skin 6 times daily Yes Samantha Melendez MD   gabapentin (NEURONTIN) 600 MG tablet TAKE ONE TABLET BY MOUTH

## 2024-03-25 DIAGNOSIS — E11.42 TYPE 2 DIABETES MELLITUS WITH DIABETIC POLYNEUROPATHY, WITH LONG-TERM CURRENT USE OF INSULIN (HCC): ICD-10-CM

## 2024-03-25 DIAGNOSIS — Z79.4 TYPE 2 DIABETES MELLITUS WITH DIABETIC POLYNEUROPATHY, WITH LONG-TERM CURRENT USE OF INSULIN (HCC): ICD-10-CM

## 2024-03-25 RX ORDER — SEMAGLUTIDE 1.34 MG/ML
INJECTION, SOLUTION SUBCUTANEOUS
Qty: 3 ML | Refills: 3 | Status: SHIPPED | OUTPATIENT
Start: 2024-03-25

## 2024-04-10 ENCOUNTER — PATIENT MESSAGE (OUTPATIENT)
Dept: ENDOCRINOLOGY | Age: 74
End: 2024-04-10

## 2024-04-10 DIAGNOSIS — E11.42 TYPE 2 DIABETES MELLITUS WITH DIABETIC POLYNEUROPATHY, WITH LONG-TERM CURRENT USE OF INSULIN (HCC): ICD-10-CM

## 2024-04-10 DIAGNOSIS — Z79.4 TYPE 2 DIABETES MELLITUS WITH DIABETIC POLYNEUROPATHY, WITH LONG-TERM CURRENT USE OF INSULIN (HCC): ICD-10-CM

## 2024-04-10 RX ORDER — INSULIN GLARGINE 100 [IU]/ML
INJECTION, SOLUTION SUBCUTANEOUS
Qty: 45 ML | Refills: 0 | Status: SHIPPED | OUTPATIENT
Start: 2024-04-10 | End: 2024-04-10 | Stop reason: SDUPTHER

## 2024-04-10 RX ORDER — INSULIN GLARGINE 100 [IU]/ML
INJECTION, SOLUTION SUBCUTANEOUS
Qty: 45 ML | Refills: 0 | Status: SHIPPED | OUTPATIENT
Start: 2024-04-10

## 2024-04-10 NOTE — TELEPHONE ENCOUNTER
From: Brad Flores  To: Dr. Samantha Melendez  Sent: 4/10/2024 2:46 PM EDT  Subject: refill    My refill for the Basaglar needs to be sent to [facility] mail order, please.

## 2024-04-10 NOTE — TELEPHONE ENCOUNTER
From: Brad Flores  To: Dr. Samantha Melendez  Sent: 4/10/2024 11:25 AM EDT  Subject: refill    Dr. Melendez,  Could you please renew my prescription for Basaglar?  Thank you

## 2024-04-17 DIAGNOSIS — Z12.5 ENCOUNTER FOR SCREENING FOR MALIGNANT NEOPLASM OF PROSTATE: ICD-10-CM

## 2024-04-17 DIAGNOSIS — E11.42 TYPE 2 DIABETES MELLITUS WITH DIABETIC POLYNEUROPATHY, WITH LONG-TERM CURRENT USE OF INSULIN (HCC): ICD-10-CM

## 2024-04-17 DIAGNOSIS — E03.8 SUBCLINICAL HYPOTHYROIDISM: ICD-10-CM

## 2024-04-17 DIAGNOSIS — Z79.4 TYPE 2 DIABETES MELLITUS WITH DIABETIC POLYNEUROPATHY, WITH LONG-TERM CURRENT USE OF INSULIN (HCC): ICD-10-CM

## 2024-04-17 DIAGNOSIS — E78.5 HYPERLIPIDEMIA, UNSPECIFIED HYPERLIPIDEMIA TYPE: ICD-10-CM

## 2024-04-17 LAB
ALBUMIN SERPL-MCNC: 4.3 G/DL (ref 3.4–5)
ALBUMIN/GLOB SERPL: 2 {RATIO} (ref 1.1–2.2)
ALP SERPL-CCNC: 89 U/L (ref 40–129)
ALT SERPL-CCNC: 20 U/L (ref 10–40)
ANION GAP SERPL CALCULATED.3IONS-SCNC: 10 MMOL/L (ref 3–16)
AST SERPL-CCNC: 16 U/L (ref 15–37)
BILIRUB SERPL-MCNC: 1.2 MG/DL (ref 0–1)
BUN SERPL-MCNC: 23 MG/DL (ref 7–20)
CALCIUM SERPL-MCNC: 8.7 MG/DL (ref 8.3–10.6)
CHLORIDE SERPL-SCNC: 109 MMOL/L (ref 99–110)
CHOLEST SERPL-MCNC: 113 MG/DL (ref 0–199)
CO2 SERPL-SCNC: 28 MMOL/L (ref 21–32)
CREAT SERPL-MCNC: 1.1 MG/DL (ref 0.8–1.3)
CREAT UR-MCNC: 120 MG/DL (ref 39–259)
GFR SERPLBLD CREATININE-BSD FMLA CKD-EPI: 71 ML/MIN/{1.73_M2}
GLUCOSE SERPL-MCNC: 73 MG/DL (ref 70–99)
HDLC SERPL-MCNC: 41 MG/DL (ref 40–60)
LDLC SERPL CALC-MCNC: 60 MG/DL
MICROALBUMIN UR DL<=1MG/L-MCNC: <1.2 MG/DL
MICROALBUMIN/CREAT UR: NORMAL MG/G (ref 0–30)
POTASSIUM SERPL-SCNC: 4.2 MMOL/L (ref 3.5–5.1)
PROT SERPL-MCNC: 6.5 G/DL (ref 6.4–8.2)
PSA SERPL DL<=0.01 NG/ML-MCNC: 5.53 NG/ML (ref 0–4)
SODIUM SERPL-SCNC: 147 MMOL/L (ref 136–145)
TRIGL SERPL-MCNC: 61 MG/DL (ref 0–150)
TSH SERPL DL<=0.005 MIU/L-ACNC: 4.44 UIU/ML (ref 0.27–4.2)
VLDLC SERPL CALC-MCNC: 12 MG/DL

## 2024-04-18 LAB
EST. AVERAGE GLUCOSE BLD GHB EST-MCNC: 145.6 MG/DL
HBA1C MFR BLD: 6.7 %

## 2024-04-23 ENCOUNTER — OFFICE VISIT (OUTPATIENT)
Dept: ENDOCRINOLOGY | Age: 74
End: 2024-04-23
Payer: COMMERCIAL

## 2024-04-23 VITALS
HEART RATE: 64 BPM | HEIGHT: 74 IN | DIASTOLIC BLOOD PRESSURE: 70 MMHG | SYSTOLIC BLOOD PRESSURE: 126 MMHG | BODY MASS INDEX: 26.95 KG/M2 | RESPIRATION RATE: 16 BRPM | WEIGHT: 210 LBS

## 2024-04-23 DIAGNOSIS — E03.8 SUBCLINICAL HYPOTHYROIDISM: ICD-10-CM

## 2024-04-23 DIAGNOSIS — E78.5 HYPERLIPIDEMIA, UNSPECIFIED HYPERLIPIDEMIA TYPE: ICD-10-CM

## 2024-04-23 DIAGNOSIS — Z79.4 TYPE 2 DIABETES MELLITUS WITH DIABETIC POLYNEUROPATHY, WITH LONG-TERM CURRENT USE OF INSULIN (HCC): Primary | ICD-10-CM

## 2024-04-23 DIAGNOSIS — E11.42 TYPE 2 DIABETES MELLITUS WITH DIABETIC POLYNEUROPATHY, WITH LONG-TERM CURRENT USE OF INSULIN (HCC): Primary | ICD-10-CM

## 2024-04-23 PROCEDURE — 3044F HG A1C LEVEL LT 7.0%: CPT | Performed by: INTERNAL MEDICINE

## 2024-04-23 PROCEDURE — G2211 COMPLEX E/M VISIT ADD ON: HCPCS | Performed by: INTERNAL MEDICINE

## 2024-04-23 PROCEDURE — 99214 OFFICE O/P EST MOD 30 MIN: CPT | Performed by: INTERNAL MEDICINE

## 2024-04-23 PROCEDURE — 1123F ACP DISCUSS/DSCN MKR DOCD: CPT | Performed by: INTERNAL MEDICINE

## 2024-04-23 NOTE — PROGRESS NOTES
Basaglar 38 .  Humalog only PRN basis   He is on 1 mg Ozempic , will increase to 2 mg weekly when he calls back as he feels good at the current dose  Patient is on on Jardiance 10 mg daily.    Diabetic Health Maintenance  Follow up with annual eye exams last feb 2024   retinopathy s/p laser left eye years ago   Follow up with annual podiatry exams if needed  Reviewed sick day management of BG     Other areas of Diabetic Education reviewed:  Carbs: good carbs and bad carbs, importance of carb counting, incorporation of protein with each meal to reduce Glycemic index, importance of portions, Carb/insulin ratio  Fats: Good fats and bad fats, meal planning and supplements.  Discussed how food affects blood sugar readings.   Different diabetic medications  Managing high and low sugar readings  Rotation of sites for subcutaneous medication injection    Mixed Hyperlipidemia  LDL goal  < 100;   TG not elevated at 84  Continue current regimen    Essential Hypertension  Blood pressure controlled. Continue current regimen    Vitamin D deficiency  Recheck levels  Supplement 1000 IU QD    SUBCLINICAL HYPOTHYROIDISM   Thyroid Ab negative   TSH 8.13 >>3.04>>4.44  Doesn't want to start medicine as yet   Now normal discussed labs         Return in about 6 months (around 10/23/2024).

## 2024-05-05 DIAGNOSIS — Z79.4 TYPE 2 DIABETES MELLITUS WITH DIABETIC POLYNEUROPATHY, WITH LONG-TERM CURRENT USE OF INSULIN (HCC): ICD-10-CM

## 2024-05-05 DIAGNOSIS — E11.42 TYPE 2 DIABETES MELLITUS WITH DIABETIC POLYNEUROPATHY, WITH LONG-TERM CURRENT USE OF INSULIN (HCC): ICD-10-CM

## 2024-05-06 ENCOUNTER — TELEPHONE (OUTPATIENT)
Dept: ENDOCRINOLOGY | Age: 74
End: 2024-05-06

## 2024-05-06 RX ORDER — GABAPENTIN 600 MG/1
TABLET ORAL
Qty: 120 TABLET | Refills: 3 | Status: SHIPPED | OUTPATIENT
Start: 2024-05-06 | End: 2024-07-06

## 2024-05-06 NOTE — TELEPHONE ENCOUNTER
Submitted PA for Jardiance  Via CMM Key: [id number]    STATUS: [facility] has indicated that it is too soon to refill this medication at the pharmacy for your patient.     If this requires a response please respond to the pool ( P MHCX PSC MEDICATION PRE-AUTH).      Thank you please advise patient.

## 2024-06-04 ENCOUNTER — TELEPHONE (OUTPATIENT)
Dept: ENDOCRINOLOGY | Age: 74
End: 2024-06-04

## 2024-06-04 NOTE — TELEPHONE ENCOUNTER
Patient received a letter in mail from insurance stating PA for Jardiance needs to be renewed this month.

## 2024-06-05 NOTE — TELEPHONE ENCOUNTER
Submitted PA for Jardiance  Via CMM Key: [id number]    STATUS: Approved today  Your PA request has been approved. The authorization is valid from 05/06/2024 through 06/05/2025.    If this requires a response please respond to the pool ( P MHCX PSC MEDICATION PRE-AUTH).      Thank you please advise patient.

## 2024-07-22 DIAGNOSIS — Z79.4 TYPE 2 DIABETES MELLITUS WITH DIABETIC POLYNEUROPATHY, WITH LONG-TERM CURRENT USE OF INSULIN (HCC): ICD-10-CM

## 2024-07-22 DIAGNOSIS — E11.42 TYPE 2 DIABETES MELLITUS WITH DIABETIC POLYNEUROPATHY, WITH LONG-TERM CURRENT USE OF INSULIN (HCC): ICD-10-CM

## 2024-07-22 RX ORDER — EMPAGLIFLOZIN 10 MG/1
TABLET, FILM COATED ORAL
Qty: 30 TABLET | Refills: 5 | Status: SHIPPED | OUTPATIENT
Start: 2024-07-22

## 2024-08-21 ENCOUNTER — TELEPHONE (OUTPATIENT)
Dept: ENDOCRINOLOGY | Age: 74
End: 2024-08-21

## 2024-08-21 DIAGNOSIS — E11.42 TYPE 2 DIABETES MELLITUS WITH DIABETIC POLYNEUROPATHY, WITH LONG-TERM CURRENT USE OF INSULIN (HCC): ICD-10-CM

## 2024-08-21 DIAGNOSIS — Z79.4 TYPE 2 DIABETES MELLITUS WITH DIABETIC POLYNEUROPATHY, WITH LONG-TERM CURRENT USE OF INSULIN (HCC): ICD-10-CM

## 2024-08-21 DIAGNOSIS — E03.8 SUBCLINICAL HYPOTHYROIDISM: ICD-10-CM

## 2024-08-21 DIAGNOSIS — E78.5 HYPERLIPIDEMIA, UNSPECIFIED HYPERLIPIDEMIA TYPE: ICD-10-CM

## 2024-08-21 LAB
ALBUMIN SERPL-MCNC: 4.6 G/DL (ref 3.4–5)
ALBUMIN/GLOB SERPL: 2.3 {RATIO} (ref 1.1–2.2)
ALP SERPL-CCNC: 80 U/L (ref 40–129)
ALT SERPL-CCNC: 39 U/L (ref 10–40)
ANION GAP SERPL CALCULATED.3IONS-SCNC: 10 MMOL/L (ref 3–16)
AST SERPL-CCNC: 25 U/L (ref 15–37)
BILIRUB SERPL-MCNC: 1.9 MG/DL (ref 0–1)
BUN SERPL-MCNC: 24 MG/DL (ref 7–20)
CALCIUM SERPL-MCNC: 9 MG/DL (ref 8.3–10.6)
CHLORIDE SERPL-SCNC: 101 MMOL/L (ref 99–110)
CHOLEST SERPL-MCNC: 108 MG/DL (ref 0–199)
CO2 SERPL-SCNC: 27 MMOL/L (ref 21–32)
CREAT SERPL-MCNC: 1.1 MG/DL (ref 0.8–1.3)
GFR SERPLBLD CREATININE-BSD FMLA CKD-EPI: 71 ML/MIN/{1.73_M2}
GLUCOSE SERPL-MCNC: 182 MG/DL (ref 70–99)
HDLC SERPL-MCNC: 41 MG/DL (ref 40–60)
LDLC SERPL CALC-MCNC: 52 MG/DL
POTASSIUM SERPL-SCNC: 4.4 MMOL/L (ref 3.5–5.1)
PROT SERPL-MCNC: 6.6 G/DL (ref 6.4–8.2)
SODIUM SERPL-SCNC: 138 MMOL/L (ref 136–145)
TRIGL SERPL-MCNC: 77 MG/DL (ref 0–150)
TSH SERPL DL<=0.005 MIU/L-ACNC: 3.04 UIU/ML (ref 0.27–4.2)
VLDLC SERPL CALC-MCNC: 15 MG/DL

## 2024-08-21 NOTE — TELEPHONE ENCOUNTER
Pt calling and states he tried to get labs done this morning but the lab would not take the orders since the expected date was 10/23/24. Please update expected date on lab orders. Pt has appt on 9/3/24    # [phone]

## 2024-08-22 LAB
EST. AVERAGE GLUCOSE BLD GHB EST-MCNC: 137 MG/DL
HBA1C MFR BLD: 6.4 %

## 2024-09-03 ENCOUNTER — OFFICE VISIT (OUTPATIENT)
Dept: ENDOCRINOLOGY | Age: 74
End: 2024-09-03
Payer: COMMERCIAL

## 2024-09-03 VITALS
BODY MASS INDEX: 27.08 KG/M2 | SYSTOLIC BLOOD PRESSURE: 135 MMHG | HEIGHT: 74 IN | HEART RATE: 59 BPM | RESPIRATION RATE: 16 BRPM | DIASTOLIC BLOOD PRESSURE: 72 MMHG | WEIGHT: 211 LBS

## 2024-09-03 DIAGNOSIS — I10 ESSENTIAL HYPERTENSION: ICD-10-CM

## 2024-09-03 DIAGNOSIS — E11.42 TYPE 2 DIABETES MELLITUS WITH DIABETIC POLYNEUROPATHY, WITH LONG-TERM CURRENT USE OF INSULIN (HCC): Primary | ICD-10-CM

## 2024-09-03 DIAGNOSIS — E03.8 SUBCLINICAL HYPOTHYROIDISM: ICD-10-CM

## 2024-09-03 DIAGNOSIS — E78.5 HYPERLIPIDEMIA, UNSPECIFIED HYPERLIPIDEMIA TYPE: ICD-10-CM

## 2024-09-03 DIAGNOSIS — Z79.4 TYPE 2 DIABETES MELLITUS WITH DIABETIC POLYNEUROPATHY, WITH LONG-TERM CURRENT USE OF INSULIN (HCC): Primary | ICD-10-CM

## 2024-09-03 PROCEDURE — G2211 COMPLEX E/M VISIT ADD ON: HCPCS | Performed by: INTERNAL MEDICINE

## 2024-09-03 PROCEDURE — 3075F SYST BP GE 130 - 139MM HG: CPT | Performed by: INTERNAL MEDICINE

## 2024-09-03 PROCEDURE — 99214 OFFICE O/P EST MOD 30 MIN: CPT | Performed by: INTERNAL MEDICINE

## 2024-09-03 PROCEDURE — 3044F HG A1C LEVEL LT 7.0%: CPT | Performed by: INTERNAL MEDICINE

## 2024-09-03 PROCEDURE — 1123F ACP DISCUSS/DSCN MKR DOCD: CPT | Performed by: INTERNAL MEDICINE

## 2024-09-03 PROCEDURE — 3078F DIAST BP <80 MM HG: CPT | Performed by: INTERNAL MEDICINE

## 2024-09-03 RX ORDER — SEMAGLUTIDE 2.68 MG/ML
2 INJECTION, SOLUTION SUBCUTANEOUS
Qty: 3 ML | Refills: 4 | Status: SHIPPED | OUTPATIENT
Start: 2024-09-03

## 2024-09-03 NOTE — PROGRESS NOTES
Brad Flores is a 74 y.o. male seen  for management of uncontrolled type 2 diabetes .  Patient has a PMH of Type 2 DM, hypertension, hyperlipidemia, DKA, BPH, PE, DVT   Diagnosed with Diabetes Mellitus  in 2004,  In 2016, he was having polyuria, polydypsia, dry mouth.   Went to ER at [facility] and was found to have UTI and DKA.  BS were >800. Oral meds were stopped and he was started in insulin.    Course has been variable .  Microvascular complications:   Estb with CEI and is + retinopathy s/p laser  (Last eye exam: 10/17, 10/18, 06/20 ), Peripheral neuropathy.  No nephropathy .       Previous medications: metformin, glimipiride.     Subclinical hypothyroidism :   TSH has been high normal.  no other symptoms suggestive of  hypothyroidism  No FH of thyroid disease.    Weight is stable.    Hyperlipidemia: Currently is on Simvastatin 40 mg daily, denies myalgias and  otherwise tolerates well.  Vitamin D deficiency: Currently is on no supplementation. Current complaints include fatigue on daily basis.    Hypertension  Controlled , denies symptoms of dizziness, light headedness. Occasional dependent edema.Tries to follow a salt restricted diet.     INTERIM   Lost another 3 lbs   Denies any hypoglycemia   Ozempic 2 mg not available so has been using 1 mg dose       Tobacco/Alcohol History:   Tobacco Use    Smoking status: Never Smoker    Smokeless tobacco: Never Used   Substance and Sexual Activity    Alcohol use: Yes     Comment: social - maybe 1/day       PMH includes  Past Medical History:   Diagnosis Date    Chronic kidney disease     Diabetic eye exam (HCC) 10/06/2017    Dr Disha Mensah    Hyperlipidemia 03/13/2018    Mild nonproliferative diabetic retinopathy of both eyes without macular edema associated with type 2 diabetes mellitus (HCC) 10/10/2017    Paroxysmal atrial fibrillation (HCC)     Personal history of DVT (deep vein thrombosis)     Pulmonary emboli (HCC)     when hospitalized for DKA    Subclinical

## 2024-09-03 NOTE — PATIENT INSTRUCTIONS
Patient Education      REDUCE BASAGLAR TO 28 UNIT S  DECREASE DOSE FURTHER DOWN   IF FASTING GLUCOSE BELOW 90   CALL OUR OFFICE IF HAVING LOWS GLUCOSE     Hypoglycemia: Care Instructions  Overview  Hypoglycemia means that your blood sugar is low and your body isn’t getting enough fuel. Low blood sugar can be caused by too much insulin or certain medicines. Some people get low blood sugar from missing a meal or exercising too hard without eating enough food.    Know your signs of low blood sugar. They're different for everyone. Common early signs include nausea; hunger; and feeling nervous, irritable, or shaky.    It can help to check your blood sugar levels often. Take your insulin or other medicine as prescribed.  How can you care for yourself?    Use the \"rule of 15\" to treat low blood sugar.  Eat 15 grams of carbohydrate from a quick-sugar food (such as 3 or 4 glucose tablets or 1/2 cup of juice). Wait 15 minutes and check your blood sugar. Repeat if your blood sugar is still below 70 mg/dL.    Eat after your blood sugar is in a safe range.  A snack or meal can reduce symptoms and prevent low blood sugar from coming back.    Tell friends, family, and coworkers how they can help you.  Make sure that they know the symptoms of low blood sugar and how to help you get your sugar levels up.    If you have glucagon, keep it with you.  Make sure that your friends, family, and coworkers know how to use it.  When should you call for help?    Call anytime you think you may need emergency care. For example, call if:  You passed out (lost consciousness).  You are confused or cannot think clearly.  Your blood sugar is very high or very low.  Watch closely for changes in your health, and be sure to contact your doctor if:  Your blood sugar stays outside the level your doctor set for you.  You have any problems.  Where can you learn more?  Go to https://www.healthwise.net/patientEd and enter R955 to learn more about

## 2024-11-08 ENCOUNTER — TELEPHONE (OUTPATIENT)
Dept: ENDOCRINOLOGY | Age: 74
End: 2024-11-08

## 2024-11-08 NOTE — TELEPHONE ENCOUNTER
Your PA request has been approved.   This is to inform you that your Prior Authorization request for the above member’s Ozempic (2 MG/DOSE) 8MG/3ML SC SOPN has been approved. If you are changing the member's therapy the previously approved therapy will be canceled and replaced. The authorization is valid from 10/09/2024 through 11/08/2025. Authorization Expiration Date: November 8, 2025.     If this requires a response please respond to the pool ( P MHCX PSC MEDICATION PRE-AUTH).      Thank you please advise patient.

## 2024-11-08 NOTE — TELEPHONE ENCOUNTER
Submitted PA for Ozempic  Via [facility] Key: [id number] STATUS: PENDING.    Follow up done daily; if no decision with in three days we will refax.  If another three days goes by with no decision will call the insurance for status.

## 2025-01-07 ENCOUNTER — TELEMEDICINE (OUTPATIENT)
Dept: ENDOCRINOLOGY | Age: 75
End: 2025-01-07
Payer: COMMERCIAL

## 2025-01-07 DIAGNOSIS — E11.42 TYPE 2 DIABETES MELLITUS WITH DIABETIC POLYNEUROPATHY, WITH LONG-TERM CURRENT USE OF INSULIN (HCC): Primary | ICD-10-CM

## 2025-01-07 DIAGNOSIS — E78.5 HYPERLIPIDEMIA, UNSPECIFIED HYPERLIPIDEMIA TYPE: ICD-10-CM

## 2025-01-07 DIAGNOSIS — Z79.4 TYPE 2 DIABETES MELLITUS WITH DIABETIC POLYNEUROPATHY, WITH LONG-TERM CURRENT USE OF INSULIN (HCC): Primary | ICD-10-CM

## 2025-01-07 DIAGNOSIS — I10 ESSENTIAL HYPERTENSION: ICD-10-CM

## 2025-01-07 DIAGNOSIS — E03.8 SUBCLINICAL HYPOTHYROIDISM: ICD-10-CM

## 2025-01-07 PROCEDURE — 1123F ACP DISCUSS/DSCN MKR DOCD: CPT | Performed by: INTERNAL MEDICINE

## 2025-01-07 PROCEDURE — G2211 COMPLEX E/M VISIT ADD ON: HCPCS | Performed by: INTERNAL MEDICINE

## 2025-01-07 PROCEDURE — 99214 OFFICE O/P EST MOD 30 MIN: CPT | Performed by: INTERNAL MEDICINE

## 2025-01-07 RX ORDER — INSULIN GLARGINE 100 [IU]/ML
INJECTION, SOLUTION SUBCUTANEOUS
Qty: 45 ML | Refills: 0 | Status: SHIPPED | OUTPATIENT
Start: 2025-01-07

## 2025-01-07 NOTE — PROGRESS NOTES
Brad Flores is a 74 y.o. male seen  for management of uncontrolled type 2 diabetes .  Patient has a PMH of Type 2 DM, hypertension, hyperlipidemia, DKA, BPH, PE, DVT   Diagnosed with Diabetes Mellitus  in 2004,  In 2016, he was having polyuria, polydypsia, dry mouth.   Went to ER at [facility] and was found to have UTI and DKA.  BS were >800. Oral meds were stopped and he was started in insulin.    Course has been variable .  Microvascular complications:   Estb with CEI and is + retinopathy s/p laser  (Last eye exam: 10/17, 10/18, 06/20 ), Peripheral neuropathy.  No nephropathy .       Previous medications: metformin, glimipiride.     Subclinical hypothyroidism :   TSH has been high normal.  no other symptoms suggestive of  hypothyroidism  No FH of thyroid disease.    Weight is stable.    Hyperlipidemia: Currently is on Simvastatin 40 mg daily, denies myalgias and  otherwise tolerates well.  Vitamin D deficiency: Currently is on no supplementation. Current complaints include fatigue on daily basis.    Hypertension  Controlled , denies symptoms of dizziness, light headedness. Occasional dependent edema.Tries to follow a salt restricted diet.     INTERIM   Weight has been stable, patient is happy at the current weight approximately 200 pounds to 205 pounds  Denies any hypoglycemia   Ozempic 2 mg weekly        Tobacco/Alcohol History:   Tobacco Use    Smoking status: Never Smoker    Smokeless tobacco: Never Used   Substance and Sexual Activity    Alcohol use: Yes     Comment: social - maybe 1/day       PMH includes  Past Medical History:   Diagnosis Date    Chronic kidney disease     Diabetic eye exam (HCC) 10/06/2017    Dr Disha Mensah    Hyperlipidemia 03/13/2018    Mild nonproliferative diabetic retinopathy of both eyes without macular edema associated with type 2 diabetes mellitus (HCC) 10/10/2017    Paroxysmal atrial fibrillation (HCC)     Personal history of DVT (deep vein thrombosis)     Pulmonary emboli ([facility])

## 2025-01-21 ENCOUNTER — OFFICE VISIT (OUTPATIENT)
Dept: INTERNAL MEDICINE CLINIC | Age: 75
End: 2025-01-21

## 2025-01-21 VITALS
HEART RATE: 72 BPM | WEIGHT: 210 LBS | SYSTOLIC BLOOD PRESSURE: 118 MMHG | BODY MASS INDEX: 26.95 KG/M2 | OXYGEN SATURATION: 97 % | TEMPERATURE: 97.3 F | DIASTOLIC BLOOD PRESSURE: 68 MMHG | HEIGHT: 74 IN

## 2025-01-21 DIAGNOSIS — Z79.4 TYPE 2 DIABETES MELLITUS WITH DIABETIC POLYNEUROPATHY, WITH LONG-TERM CURRENT USE OF INSULIN (HCC): ICD-10-CM

## 2025-01-21 DIAGNOSIS — E11.42 TYPE 2 DIABETES MELLITUS WITH DIABETIC POLYNEUROPATHY, WITH LONG-TERM CURRENT USE OF INSULIN (HCC): Primary | ICD-10-CM

## 2025-01-21 DIAGNOSIS — E78.5 HYPERLIPIDEMIA, UNSPECIFIED HYPERLIPIDEMIA TYPE: ICD-10-CM

## 2025-01-21 DIAGNOSIS — E03.8 SUBCLINICAL HYPOTHYROIDISM: ICD-10-CM

## 2025-01-21 DIAGNOSIS — Z00.00 ANNUAL PHYSICAL EXAM: ICD-10-CM

## 2025-01-21 DIAGNOSIS — Z86.711 HISTORY OF PULMONARY EMBOLUS (PE): ICD-10-CM

## 2025-01-21 DIAGNOSIS — E11.42 TYPE 2 DIABETES MELLITUS WITH DIABETIC POLYNEUROPATHY, WITH LONG-TERM CURRENT USE OF INSULIN (HCC): ICD-10-CM

## 2025-01-21 DIAGNOSIS — I48.0 PAROXYSMAL ATRIAL FIBRILLATION (HCC): ICD-10-CM

## 2025-01-21 DIAGNOSIS — H93.13 TINNITUS OF BOTH EARS: ICD-10-CM

## 2025-01-21 DIAGNOSIS — I10 ESSENTIAL HYPERTENSION: ICD-10-CM

## 2025-01-21 DIAGNOSIS — Z79.4 TYPE 2 DIABETES MELLITUS WITH DIABETIC POLYNEUROPATHY, WITH LONG-TERM CURRENT USE OF INSULIN (HCC): Primary | ICD-10-CM

## 2025-01-21 LAB
ALBUMIN SERPL-MCNC: 4.6 G/DL (ref 3.4–5)
ALBUMIN/GLOB SERPL: 2.1 {RATIO} (ref 1.1–2.2)
ALP SERPL-CCNC: 96 U/L (ref 40–129)
ALT SERPL-CCNC: 28 U/L (ref 10–40)
ANION GAP SERPL CALCULATED.3IONS-SCNC: 11 MMOL/L (ref 3–16)
AST SERPL-CCNC: 27 U/L (ref 15–37)
BILIRUB SERPL-MCNC: 1.1 MG/DL (ref 0–1)
BUN SERPL-MCNC: 21 MG/DL (ref 7–20)
CALCIUM SERPL-MCNC: 9.4 MG/DL (ref 8.3–10.6)
CHLORIDE SERPL-SCNC: 106 MMOL/L (ref 99–110)
CHOLEST SERPL-MCNC: 131 MG/DL (ref 0–199)
CO2 SERPL-SCNC: 27 MMOL/L (ref 21–32)
CREAT SERPL-MCNC: 1 MG/DL (ref 0.8–1.3)
CREAT UR-MCNC: 82.1 MG/DL (ref 39–259)
GFR SERPLBLD CREATININE-BSD FMLA CKD-EPI: 79 ML/MIN/{1.73_M2}
GLUCOSE SERPL-MCNC: 105 MG/DL (ref 70–99)
HDLC SERPL-MCNC: 35 MG/DL (ref 40–60)
LDLC SERPL CALC-MCNC: 79 MG/DL
MICROALBUMIN UR DL<=1MG/L-MCNC: <1.2 MG/DL
MICROALBUMIN/CREAT UR: NORMAL MG/G (ref 0–30)
POTASSIUM SERPL-SCNC: 4.7 MMOL/L (ref 3.5–5.1)
PROT SERPL-MCNC: 6.8 G/DL (ref 6.4–8.2)
SODIUM SERPL-SCNC: 144 MMOL/L (ref 136–145)
TRIGL SERPL-MCNC: 84 MG/DL (ref 0–150)
TSH SERPL DL<=0.005 MIU/L-ACNC: 4.09 UIU/ML (ref 0.27–4.2)
VLDLC SERPL CALC-MCNC: 17 MG/DL

## 2025-01-21 SDOH — ECONOMIC STABILITY: FOOD INSECURITY: WITHIN THE PAST 12 MONTHS, YOU WORRIED THAT YOUR FOOD WOULD RUN OUT BEFORE YOU GOT MONEY TO BUY MORE.: NEVER TRUE

## 2025-01-21 SDOH — ECONOMIC STABILITY: FOOD INSECURITY: WITHIN THE PAST 12 MONTHS, THE FOOD YOU BOUGHT JUST DIDN'T LAST AND YOU DIDN'T HAVE MONEY TO GET MORE.: NEVER TRUE

## 2025-01-21 ASSESSMENT — PATIENT HEALTH QUESTIONNAIRE - PHQ9
SUM OF ALL RESPONSES TO PHQ QUESTIONS 1-9: 0
SUM OF ALL RESPONSES TO PHQ QUESTIONS 1-9: 0
1. LITTLE INTEREST OR PLEASURE IN DOING THINGS: NOT AT ALL
SUM OF ALL RESPONSES TO PHQ QUESTIONS 1-9: 0
SUM OF ALL RESPONSES TO PHQ9 QUESTIONS 1 & 2: 0
2. FEELING DOWN, DEPRESSED OR HOPELESS: NOT AT ALL
SUM OF ALL RESPONSES TO PHQ QUESTIONS 1-9: 0

## 2025-01-21 NOTE — ASSESSMENT & PLAN NOTE
Stable.  Follows with endocrinology.  - Continue Ozempic  - Continue Basaglar  - Continue Premeal insulin as needed-patient is not requiring much of this  - Continue Jardiance-dose was recently increased  - Continue Zocor  - Continue low-carb

## 2025-01-21 NOTE — ASSESSMENT & PLAN NOTE
Patient has a history of unprovoked DVT and PE in 2016.  He completed a short-term treatment with oral anticoagulation.  He saw cardiology as an outpatient once, however from chart review I do not see any thrombosis workup or clear instructions stating to stop oral anticoagulation.  Per patient, father had a blood clot.  - Referred to hematology to complete workup if needed and received definitive instructions as to whether or not he should be on indefinite anticoagulation

## 2025-01-21 NOTE — PROGRESS NOTES
2025    Brad Flores (:  1950) is a 74 y.o. male, here for evaluation of the following medical concerns:    Chief Complaint   Patient presents with    Establish Care        HPI    Patient is here to establish care. Prior patient of Dr. Payne.     He follows with endocrinology. His jardiance was just increased. His fasting AM blood glucose is . He is a  at floor [employer], so he does a lot of heavy lifting. He tries to eat relatively healthy, low carb diet. He checks his blood sugar before meals, but is usually not requiring premeal insulin (maybe twice in the last month).     He has R eye swelling for which he is being seen by [facility]. He has had laser in the past which helped but left scar tissue. He is now using steroid drop.        Prior to Visit Medications    Medication Sig Taking? Authorizing Provider   insulin glargine (BASAGLAR KWIKPEN) 100 UNIT/ML injection pen Inject 38 units daily Yes Samantha Melendez MD   empagliflozin (JARDIANCE) 25 MG tablet Take 1 tablet by mouth every morning Yes Samantha Melendez MD   semaglutide, 2 MG/DOSE, (OZEMPIC, 2 MG/DOSE,) 8 MG/3ML SOPN sc injection Inject 2 mg into the skin every 7 days Yes Samantha Melendez MD   gabapentin (NEURONTIN) 600 MG tablet TAKE 1 TABLET BY MOUTH 4 TIMES A DAY  Patient taking differently: Take 1 tablet by mouth daily. TAKE 1 TABLET BY MOUTH 4 TIMES A DAY Yes Samantha Melendez MD   simvastatin (ZOCOR) 40 MG tablet Take 1 tablet by mouth nightly Yes Nancie Payne MD   blood glucose test strips (ONETOUCH ULTRA) strip USE AND DISCARD 1 TEST     STRIP 3 TIMES DAILY Yes Samantha Melendez MD   Insulin Pen Needle (BD PEN NEEDLE CHELSEA 2ND GEN) 32G X 4 MM MISC Inject 1 each into the skin 6 times daily Yes Samantha Melendez MD   insulin lispro, 1 Unit Dial, (HUMALOG KWIKPEN) 100 UNIT/ML SOPN INJECT 10 TO 18 UNITS SUBCUTANEOUSLY 3 TIMES A  DAY BEFORE MEALS Yes Yolie Blake, APRN - CNP   Multiple Vitamins-Minerals (MULTIVITAMIN ADULT PO) Take by mouth daily

## 2025-01-21 NOTE — ASSESSMENT & PLAN NOTE
He had 2 episodes of afib, one of them 2/2 PE. No recurrence of episodes. Was evaluated by cardiology and completed Holter which was negative, was told no indication for anticoag.  - Continue to monitor for recurrence

## 2025-01-22 LAB
EST. AVERAGE GLUCOSE BLD GHB EST-MCNC: 154.2 MG/DL
HBA1C MFR BLD: 7 %

## 2025-01-23 LAB
C PEPTIDE SERPL-MCNC: 0.9 NG/ML (ref 1.1–4.4)
GAD65 AB SER IA-ACNC: <5 IU/ML (ref 0–5)

## 2025-03-12 DIAGNOSIS — E10.42 TYPE 1 DIABETES MELLITUS WITH DIABETIC POLYNEUROPATHY (HCC): ICD-10-CM

## 2025-03-12 DIAGNOSIS — E11.42 TYPE 2 DIABETES MELLITUS WITH DIABETIC POLYNEUROPATHY, WITH LONG-TERM CURRENT USE OF INSULIN (HCC): ICD-10-CM

## 2025-03-12 DIAGNOSIS — Z79.4 TYPE 2 DIABETES MELLITUS WITH DIABETIC POLYNEUROPATHY, WITH LONG-TERM CURRENT USE OF INSULIN (HCC): ICD-10-CM

## 2025-03-12 RX ORDER — BLOOD SUGAR DIAGNOSTIC
STRIP MISCELLANEOUS
Qty: 300 STRIP | Refills: 3 | Status: SHIPPED | OUTPATIENT
Start: 2025-03-12

## 2025-03-12 RX ORDER — PEN NEEDLE, DIABETIC 32GX 5/32"
1 NEEDLE, DISPOSABLE MISCELLANEOUS
Qty: 480 EACH | Refills: 2 | Status: SHIPPED | OUTPATIENT
Start: 2025-03-12

## 2025-03-12 RX ORDER — INSULIN GLARGINE 100 [IU]/ML
INJECTION, SOLUTION SUBCUTANEOUS
Qty: 45 ML | Refills: 2 | Status: SHIPPED | OUTPATIENT
Start: 2025-03-12

## 2025-03-12 NOTE — TELEPHONE ENCOUNTER
Medication:   Requested Prescriptions     Pending Prescriptions Disp Refills    insulin glargine (BASAGLAR KWIKPEN) 100 UNIT/ML injection pen 45 mL 2     Sig: Inject 38 units daily    blood glucose test strips (ONETOUCH ULTRA) strip 300 strip 3     Sig: USE AND DISCARD 1 TEST     STRIP 3 TIMES DAILY    Insulin Pen Needle (BD PEN NEEDLE CHELSEA 2ND GEN) 32G X 4 MM MISC 480 each 2     Sig: Inject 1 each into the skin 6 times daily       Last Filled:      Patient Phone Number: [phone] (home)     Last appt: 1/7/2025   Next appt: 6/11/2025    Last Labs DM:   Lab Results   Component Value Date/Time    LABA1C 7.0 01/21/2025 08:11 AM

## 2025-03-17 ENCOUNTER — PATIENT MESSAGE (OUTPATIENT)
Dept: INTERNAL MEDICINE CLINIC | Age: 75
End: 2025-03-17

## 2025-03-17 RX ORDER — SIMVASTATIN 40 MG
40 TABLET ORAL NIGHTLY
Qty: 90 TABLET | Refills: 1 | Status: SHIPPED | OUTPATIENT
Start: 2025-03-17

## 2025-05-06 ENCOUNTER — TELEPHONE (OUTPATIENT)
Dept: INTERNAL MEDICINE CLINIC | Age: 75
End: 2025-05-06

## 2025-05-06 NOTE — TELEPHONE ENCOUNTER
Lm for pt to move 5/14 appt due to MD coming in late.     Can schedule in may still since a reschedule.

## 2025-05-28 ENCOUNTER — OFFICE VISIT (OUTPATIENT)
Dept: INTERNAL MEDICINE CLINIC | Age: 75
End: 2025-05-28

## 2025-05-28 ENCOUNTER — TELEPHONE (OUTPATIENT)
Dept: ENDOCRINOLOGY | Age: 75
End: 2025-05-28

## 2025-05-28 VITALS
OXYGEN SATURATION: 97 % | WEIGHT: 207.4 LBS | HEART RATE: 62 BPM | TEMPERATURE: 97.7 F | BODY MASS INDEX: 26.63 KG/M2 | DIASTOLIC BLOOD PRESSURE: 76 MMHG | SYSTOLIC BLOOD PRESSURE: 118 MMHG

## 2025-05-28 DIAGNOSIS — Z01.818 PREOP EXAMINATION: Primary | ICD-10-CM

## 2025-05-28 ASSESSMENT — ENCOUNTER SYMPTOMS
SHORTNESS OF BREATH: 0
SINUS PAIN: 0
WHEEZING: 0
CHEST TIGHTNESS: 0
DIARRHEA: 0
SINUS PRESSURE: 0
BACK PAIN: 0
VOICE CHANGE: 0
COUGH: 0
SORE THROAT: 0
NAUSEA: 0
VOMITING: 0
ABDOMINAL DISTENTION: 0
CONSTIPATION: 0
ABDOMINAL PAIN: 0
BLOOD IN STOOL: 0
TROUBLE SWALLOWING: 0

## 2025-05-28 NOTE — TELEPHONE ENCOUNTER
Submitted PA for Jardiance  Via CMM Key: [id number] STATUS: PENDING.    Follow up done daily; if no decision with in three days we will refax.  If another three days goes by with no decision will call the insurance for status.

## 2025-05-28 NOTE — TELEPHONE ENCOUNTER
Approved today by [facility] 2017  Your PA request has been approved. This is to inform you that your Prior Authorization request for the above member’s Jardiance 25MG OR TABS has been approved. If you are changing the member's therapy the previously approved therapy will be canceled and replaced. The authorization is valid from 04/28/2025 through 05/28/2026  Effective Date: 4/28/2025  Authorization Expiration Date: 5/28/2026    If this requires a response please respond to the pool ( P [facility] MEDICATION PRE-AUTH).      Thank you please advise patient.

## 2025-05-28 NOTE — PROGRESS NOTES
oz).  Physical Exam  Vitals reviewed.   Constitutional:       General: He is not in acute distress.     Appearance: Normal appearance.   HENT:      Head: Normocephalic and atraumatic.      Mouth/Throat:      Pharynx: Oropharynx is clear.   Eyes:      Conjunctiva/sclera: Conjunctivae normal.      Pupils: Pupils are equal, round, and reactive to light.   Cardiovascular:      Rate and Rhythm: Normal rate and regular rhythm.      Pulses: Normal pulses.      Heart sounds: Normal heart sounds.   Pulmonary:      Effort: Pulmonary effort is normal. No respiratory distress.      Breath sounds: Normal breath sounds. No wheezing or rales.   Abdominal:      Palpations: Abdomen is soft.      Tenderness: There is no abdominal tenderness. There is no rebound.   Musculoskeletal:         General: No signs of injury. Normal range of motion.      Cervical back: Normal range of motion and neck supple.   Skin:     General: Skin is warm and dry.      Coloration: Skin is not jaundiced.   Neurological:      General: No focal deficit present.      Mental Status: He is alert and oriented to person, place, and time.   Psychiatric:         Behavior: Behavior normal.         Thought Content: Thought content normal.         Judgment: Judgment normal.         Labs and Studies  Lab Results   Component Value Date     01/21/2025    K 4.7 01/21/2025     01/21/2025    CO2 27 01/21/2025    BUN 21 (H) 01/21/2025    CREATININE 1.0 01/21/2025    GLUCOSE 105 (H) 01/21/2025    CALCIUM 9.4 01/21/2025    PHOS 3.5 01/26/2016     Lab Results   Component Value Date    WBC 12.6 (H) 05/25/2017    HGB 15.7 05/25/2017    HCT 46.7 05/25/2017    MCV 83.9 05/25/2017     05/25/2017     No components found for: \"HGBA1C\"      Assessment and Plan   1. Preop examination  Medically maximized for procedure     Here for preoperative consultation.  Patient's revised cardiac risk stratification is 0 points.  ASA class II. No current complaints to suggest

## 2025-06-01 DIAGNOSIS — Z79.4 TYPE 2 DIABETES MELLITUS WITH DIABETIC POLYNEUROPATHY, WITH LONG-TERM CURRENT USE OF INSULIN (HCC): ICD-10-CM

## 2025-06-01 DIAGNOSIS — E11.42 TYPE 2 DIABETES MELLITUS WITH DIABETIC POLYNEUROPATHY, WITH LONG-TERM CURRENT USE OF INSULIN (HCC): ICD-10-CM

## 2025-06-02 NOTE — TELEPHONE ENCOUNTER
Medication:   Requested Prescriptions     Pending Prescriptions Disp Refills    gabapentin (NEURONTIN) 600 MG tablet [Pharmacy Med Name: GABAPENTIN 600 MG TABLET] 120 tablet 3     Sig: TAKE 1 TABLET BY MOUTH 4 TIMES A DAY       Last Filled:      Patient Phone Number: [phone] (home)     Last appt: 1/7/2025   Next appt: 6/11/2025    Last Labs DM:   Lab Results   Component Value Date/Time    LABA1C 7.0 01/21/2025 08:11 AM     Last Lipid:   Lab Results   Component Value Date/Time    CHOL 131 01/21/2025 08:11 AM    TRIG 84 01/21/2025 08:11 AM    HDL 35 01/21/2025 08:11 AM    HDL 38 11/01/2011 10:59 AM     Last PSA:   Lab Results   Component Value Date/Time    PSA 5.53 04/17/2024 07:49 AM     Last Thyroid:   Lab Results   Component Value Date/Time    TSH 4.09 01/21/2025 08:11 AM    TSH 3.32 04/14/2016 08:19 AM    FT3 3.1 06/27/2018 09:23 AM    J7PAWMD 1.09 01/06/2023 08:04 AM    T4FREE 1.2 01/06/2023 08:04 AM

## 2025-06-03 RX ORDER — GABAPENTIN 600 MG/1
TABLET ORAL
Qty: 120 TABLET | Refills: 0 | Status: SHIPPED | OUTPATIENT
Start: 2025-06-03 | End: 2025-07-03

## 2025-06-10 DIAGNOSIS — Z79.4 TYPE 2 DIABETES MELLITUS WITH DIABETIC POLYNEUROPATHY, WITH LONG-TERM CURRENT USE OF INSULIN (HCC): ICD-10-CM

## 2025-06-10 DIAGNOSIS — E11.42 TYPE 2 DIABETES MELLITUS WITH DIABETIC POLYNEUROPATHY, WITH LONG-TERM CURRENT USE OF INSULIN (HCC): ICD-10-CM

## 2025-06-10 LAB
ALBUMIN SERPL-MCNC: 4.3 G/DL (ref 3.4–5)
ALBUMIN/GLOB SERPL: 2.3 {RATIO} (ref 1.1–2.2)
ALP SERPL-CCNC: 80 U/L (ref 40–129)
ALT SERPL-CCNC: 36 U/L (ref 10–40)
ANION GAP SERPL CALCULATED.3IONS-SCNC: 10 MMOL/L (ref 3–16)
AST SERPL-CCNC: 21 U/L (ref 15–37)
BILIRUB SERPL-MCNC: 1.1 MG/DL (ref 0–1)
BUN SERPL-MCNC: 20 MG/DL (ref 7–20)
CALCIUM SERPL-MCNC: 9.1 MG/DL (ref 8.3–10.6)
CHLORIDE SERPL-SCNC: 107 MMOL/L (ref 99–110)
CHOLEST SERPL-MCNC: 157 MG/DL (ref 0–199)
CO2 SERPL-SCNC: 28 MMOL/L (ref 21–32)
CREAT SERPL-MCNC: 1 MG/DL (ref 0.8–1.3)
CREAT UR-MCNC: 76.3 MG/DL (ref 39–259)
GFR SERPLBLD CREATININE-BSD FMLA CKD-EPI: 79 ML/MIN/{1.73_M2}
GLUCOSE SERPL-MCNC: 133 MG/DL (ref 70–99)
HDLC SERPL-MCNC: 45 MG/DL (ref 40–60)
LDLC SERPL CALC-MCNC: 99 MG/DL
MICROALBUMIN UR DL<=1MG/L-MCNC: <1.2 MG/DL
MICROALBUMIN/CREAT UR: NORMAL MG/G (ref 0–30)
POTASSIUM SERPL-SCNC: 4.4 MMOL/L (ref 3.5–5.1)
PROT SERPL-MCNC: 6.2 G/DL (ref 6.4–8.2)
SODIUM SERPL-SCNC: 145 MMOL/L (ref 136–145)
TRIGL SERPL-MCNC: 63 MG/DL (ref 0–150)
TSH SERPL DL<=0.005 MIU/L-ACNC: 2.78 UIU/ML (ref 0.27–4.2)
VLDLC SERPL CALC-MCNC: 13 MG/DL

## 2025-06-11 ENCOUNTER — OFFICE VISIT (OUTPATIENT)
Dept: ENDOCRINOLOGY | Age: 75
End: 2025-06-11
Payer: COMMERCIAL

## 2025-06-11 VITALS
DIASTOLIC BLOOD PRESSURE: 76 MMHG | HEIGHT: 74 IN | RESPIRATION RATE: 16 BRPM | HEART RATE: 62 BPM | BODY MASS INDEX: 26.69 KG/M2 | WEIGHT: 208 LBS | SYSTOLIC BLOOD PRESSURE: 120 MMHG

## 2025-06-11 DIAGNOSIS — Z79.4 TYPE 2 DIABETES MELLITUS WITH DIABETIC POLYNEUROPATHY, WITH LONG-TERM CURRENT USE OF INSULIN (HCC): Primary | ICD-10-CM

## 2025-06-11 DIAGNOSIS — E03.8 SUBCLINICAL HYPOTHYROIDISM: ICD-10-CM

## 2025-06-11 DIAGNOSIS — E78.5 HYPERLIPIDEMIA, UNSPECIFIED HYPERLIPIDEMIA TYPE: ICD-10-CM

## 2025-06-11 DIAGNOSIS — E11.42 TYPE 2 DIABETES MELLITUS WITH DIABETIC POLYNEUROPATHY, WITH LONG-TERM CURRENT USE OF INSULIN (HCC): Primary | ICD-10-CM

## 2025-06-11 DIAGNOSIS — I10 ESSENTIAL HYPERTENSION: ICD-10-CM

## 2025-06-11 LAB
EST. AVERAGE GLUCOSE BLD GHB EST-MCNC: 174.3 MG/DL
HBA1C MFR BLD: 7.7 %

## 2025-06-11 PROCEDURE — 3078F DIAST BP <80 MM HG: CPT | Performed by: INTERNAL MEDICINE

## 2025-06-11 PROCEDURE — G2211 COMPLEX E/M VISIT ADD ON: HCPCS | Performed by: INTERNAL MEDICINE

## 2025-06-11 PROCEDURE — 99214 OFFICE O/P EST MOD 30 MIN: CPT | Performed by: INTERNAL MEDICINE

## 2025-06-11 PROCEDURE — 3074F SYST BP LT 130 MM HG: CPT | Performed by: INTERNAL MEDICINE

## 2025-06-11 PROCEDURE — 1123F ACP DISCUSS/DSCN MKR DOCD: CPT | Performed by: INTERNAL MEDICINE

## 2025-06-11 PROCEDURE — 3051F HG A1C>EQUAL 7.0%<8.0%: CPT | Performed by: INTERNAL MEDICINE

## 2025-06-11 NOTE — PROGRESS NOTES
Brad Flores is a 74 y.o. male seen  for management of uncontrolled type 2 diabetes .  Patient has a PMH of Type 2 DM, hypertension, hyperlipidemia, DKA, BPH, PE, DVT   Diagnosed with Diabetes Mellitus  in 2004,  In 2016, he was having polyuria, polydypsia, dry mouth.   Went to ER at [facility] and was found to have UTI and DKA.  BS were >800. Oral meds were stopped and he was started in insulin.    Course has been variable .  Microvascular complications:   Estb with [facility] and is + retinopathy s/p laser  (Last eye exam: 10/17, 10/18, 06/20 ), Peripheral neuropathy.  No nephropathy .       Previous medications: metformin, glimipiride.     Subclinical hypothyroidism :   TSH has been high normal.  no other symptoms suggestive of  hypothyroidism  No FH of thyroid disease.    Weight is stable.    Hyperlipidemia: Currently is on Simvastatin 40 mg daily, denies myalgias and  otherwise tolerates well.  Vitamin D deficiency: Currently is on no supplementation. Current complaints include fatigue on daily basis.    Hypertension  Controlled , denies symptoms of dizziness, light headedness. Occasional dependent edema.Tries to follow a salt restricted diet.     INTERIM     Denies any hypoglycemia   Ozempic 2 mg weekly        Tobacco/Alcohol History:   Tobacco Use    Smoking status: Never Smoker    Smokeless tobacco: Never Used   Substance and Sexual Activity    Alcohol use: Yes     Comment: social - maybe 1/day       PMH includes  Past Medical History:   Diagnosis Date    Chronic kidney disease     Diabetic eye exam (HCC) 10/06/2017    Dr Disha Mensah    Hyperlipidemia 03/13/2018    Mild nonproliferative diabetic retinopathy of both eyes without macular edema associated with type 2 diabetes mellitus (HCC) 10/10/2017    Paroxysmal atrial fibrillation (HCC)     Personal history of DVT (deep vein thrombosis)     Pulmonary emboli (HCC)     when hospitalized for DKA    Subclinical hypothyroidism 10/29/2018    Type 2 diabetes mellitus

## 2025-08-10 DIAGNOSIS — Z79.4 TYPE 2 DIABETES MELLITUS WITH DIABETIC POLYNEUROPATHY, WITH LONG-TERM CURRENT USE OF INSULIN (HCC): ICD-10-CM

## 2025-08-10 DIAGNOSIS — E11.42 TYPE 2 DIABETES MELLITUS WITH DIABETIC POLYNEUROPATHY, WITH LONG-TERM CURRENT USE OF INSULIN (HCC): ICD-10-CM

## 2025-08-11 RX ORDER — GABAPENTIN 600 MG/1
TABLET ORAL
Qty: 60 TABLET | Refills: 2 | Status: SHIPPED | OUTPATIENT
Start: 2025-08-11 | End: 2025-10-11

## 2025-08-22 ENCOUNTER — PATIENT MESSAGE (OUTPATIENT)
Dept: ENDOCRINOLOGY | Age: 75
End: 2025-08-22

## 2025-08-22 DIAGNOSIS — Z79.4 TYPE 2 DIABETES MELLITUS WITH DIABETIC POLYNEUROPATHY, WITH LONG-TERM CURRENT USE OF INSULIN (HCC): ICD-10-CM

## 2025-08-22 DIAGNOSIS — E11.42 TYPE 2 DIABETES MELLITUS WITH DIABETIC POLYNEUROPATHY, WITH LONG-TERM CURRENT USE OF INSULIN (HCC): ICD-10-CM
